# Patient Record
Sex: FEMALE | NOT HISPANIC OR LATINO | Employment: FULL TIME | URBAN - METROPOLITAN AREA
[De-identification: names, ages, dates, MRNs, and addresses within clinical notes are randomized per-mention and may not be internally consistent; named-entity substitution may affect disease eponyms.]

---

## 2017-03-28 ENCOUNTER — OFFICE VISIT (OUTPATIENT)
Dept: DERMATOLOGY | Facility: CLINIC | Age: 42
End: 2017-03-28
Payer: COMMERCIAL

## 2017-03-28 DIAGNOSIS — L71.9 ACNE ROSACEA: ICD-10-CM
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
## 2017-03-28 DIAGNOSIS — D22.9 MULTIPLE BENIGN NEVI: ICD-10-CM

## 2017-03-28 PROCEDURE — 99213 OFFICE O/P EST LOW 20 MIN: CPT | Performed by: DERMATOLOGY

## 2017-03-28 RX ORDER — AZELAIC ACID 0.15 G/G
GEL TOPICAL
Qty: 50 G | Refills: 8 | Status: SHIPPED | OUTPATIENT
Start: 2017-03-28 | End: 2018-03-27

## 2017-03-28 RX ORDER — IVERMECTIN 10 MG/G
CREAM TOPICAL
Qty: 30 G | Refills: 3 | Status: SHIPPED | OUTPATIENT
Start: 2017-03-28 | End: 2018-02-01

## 2017-03-28 RX ORDER — IVERMECTIN 10 MG/G
CREAM TOPICAL
Qty: 30 G | Refills: 3 | Status: SHIPPED | OUTPATIENT
Start: 2017-03-28 | End: 2017-03-28

## 2017-03-28 NOTE — PROGRESS NOTES
[facility] Dermatology Note      Dermatology Problem List:  1. Acne rosacea  -Current Tx: Soolantra (initiated 8/2/2016), Azelaic acid 15% gel (initiaed 8/13/2015)  -Previous Tx: minocycline (initiated, (3/25/2014-8/13/2015, restarted 8/2/2016) with nausea, Metrogel (initiated 3/25/2014)  2. Melasma, cheeks  -Previous Tx: hydroquinone (initiated 7/8/2014)    Encounter Date: Mar 28, 2017    CC:  Chief Complaint   Patient presents with     Derm Problem     rosacea         History of Present Illness:  Ms. Nichole Berkowitz is a 41 year old female who presents as a follow-up for acne rosacea. The patient was last seen 10/6/2016 when metrogel was held and Mirvaso started. Today, the patient reports that she is using Soolantra and Azelaic Acid, one in the am and one in the pm with improvement and is stable. Reports history of tanning when she was younger. Denies any lesions bleeding, crusting or changing. The patient reports no other lesions of concern.    Past Medical History:   Patient Active Problem List   Diagnosis     Acne rosacea     Melasma     History reviewed. No pertinent past medical history.  History reviewed. No pertinent surgical history.    Social History:  The patient works as a . The patient uses 3-6  alcoholic beverages per week.     Family History:  There is no family history of skin cancer.  There is no family history of melanoma.  There is no family history of asthma, eczema or allergies.    Medications:  Current Outpatient Prescriptions   Medication Sig Dispense Refill     ivermectin (SOOLANTRA) 1 % cream Apply to the affected areas of the face once daily. Use a pea-size amount for each area of the face (forehead, chin, nose, each cheek) that is affected. Spread as a thin layer, avoiding the eyes and lips. 30 g 3     Biotin 10 MG CAPS        Probiotic Product (PROBIOTIC & ACIDOPHILUS EX ST PO)        Azelaic Acid 15 % gel Apply a thin layer to the face once  daily. 50 g 8     Omega-3 Fatty Acids (OMEGA-3 FISH OIL PO)        multivitamin, therapeutic with minerals (MULTI-VITAMIN) TABS Take 1 tablet by mouth daily 100 tablet 3     Ascorbic Acid 500 MG CHEW Take 1 chew tab by mouth 30 tablet      Allergies   Allergen Reactions     Sulfa Drugs Hives     Review of Systems:   -Skin: As above in HPI. No additional skin concerns.  -Const: The patient is generally feeling well today.     Physical exam:  GEN: This is a well developed, well-nourished female in no acute distress, in a pleasant mood.    SKIN: Total skin excluding the undergarment areas was performed. The exam included the head/face, neck, both arms, chest, back, abdomen, both legs, digits and/or nails. Declines genital exam or exam of buttocks.  -Telangiectasias on the cheeks.   -Multiple regular brown pigmented macules and papules are identified on the trunk and extremities.  -Nails are painted.    -No other lesions of concern on areas examined.     Impression/Plan:  1. History of tanning    Sun precaution was advised including the use of sun screens of SPF 30 or higher, sun protective clothing, and avoidance of tanning beds.  2. Multiple clinically benign nevi, trunk and extremities    No further intervention required at this time.     Recommend checking nails for moles between polish changes. Discussed with patient what features to watch  3. Acne rosacea, face. Nausea with minocycline use. Improvement with topicals, is happy with current treatment plan.     Continue Soolantra 1% cream. Apply to affected areas on the face once daily. Use a pea-sized amount for each area of the face that is affected. Spread a thin layer, avoiding the eyes and lips.    Continue azelaic acid 15% gel. Apply once daily to the face in the morning    Discussed PDL in the fall. Discussed the patient cannot be tan for procedure.     Follow up in 1 year, earlier for new or changing lesions.     Staff Involved:  Staff/Scribe    Scribe  Disclosure:   I, Olivia Lal, am serving as a scribe to document services personally performed by Dr. Mere Liu, based on data collection and the provider's statements to me.     Provider Disclosure:   I agree with above History, Review of Systems, Physical exam and Plan. I have reviewed the content of the documentation and have edited it as needed. I have personally performed the services documented here and the documentation accurately represents those services and the decisions I have made.     Mere Liu MD    Department of Dermatology  [facility]: Phone: [phone], Fax:[phone]  [facility]: Phone: [phone], Fax: [phone]

## 2017-03-28 NOTE — MR AVS SNAPSHOT
After Visit Summary   3/28/2017    Nichole Berkowitz    MRN: [id number]           Patient Information     Date Of Birth          1975        Visit Information        Provider Department      3/28/2017 9:30 AM Mere Liu MD [facility]        Today's Diagnoses     Tanning bed exposure, initial encounter    -  1    Rosacea        Acne rosacea           Follow-ups after your visit        Your next 10 appointments already scheduled     Mar 27, 2018  9:30 AM CDT   Return Visit with Mere Liu MD   [facility] ([facility]    [id number] [place] [address] [phone]              Who to contact     If you have questions or need follow up information about today's clinic visit or your schedule please contact [facility] directly at [phone].  Normal or non-critical lab and imaging results will be communicated to you by MyChart, letter or phone within 4 business days after the clinic has received the results. If you do not hear from us within 7 days, please contact the clinic through MyChart or phone. If you have a critical or abnormal lab result, we will notify you by phone as soon as possible.  Submit refill requests through Dovetail or call your pharmacy and they will forward the refill request to us. Please allow 3 business days for your refill to be completed.          Additional Information About Your Visit        MyChart Information     Dovetail is an electronic gateway that provides easy, online access to your medical records. With Dovetail, you can request a clinic appointment, read your test results, renew a prescription or communicate with your care team.     To sign up for Dovetail visit the website at [email or web address]   You will be asked to enter the access code listed below, as well as some personal information. Please follow the directions to create your username and password.      Your access code is: [email or web address]  Expires: 2017 10:24 AM     Your access code will  in 90 days. If you need help or a new code, please contact your [facility] or call [phone] for assistance.        Care EveryWhere ID     This is your Care EveryWhere ID. This could be used by other organizations to access your Mobile medical records  [id number]         Blood Pressure from Last 3 Encounters:   14 101/78    Weight from Last 3 Encounters:   10/17/14 174 lb (78.9 kg)              Today, you had the following     No orders found for display         Where to get your medicines      These medications were sent to [place] [address] [place] [facility] [place], [place] - [address] [facility]  [address] [facility] [address]     Phone:  [phone]     Azelaic Acid 15 % gel         Some of these will need a paper prescription and others can be bought over the counter.  Ask your nurse if you have questions.     Bring a paper prescription for each of these medications     ivermectin 1 % cream          Primary Care Provider Office Phone # Fax #    Sea Hernandez [phone]       [address]  [facility] [address]        Thank you!     Thank you for choosing [facility]  for your care. Our goal is always to provide you with excellent care. Hearing back from our patients is one way we can continue to improve our services. Please take a few minutes to complete the written survey that you may receive in the mail after your visit with us. Thank you!             Your Updated Medication List - Protect others around you: Learn how to safely use, store and throw away your medicines at www.disposemymeds.org.          This list is accurate as of: 3/28/17 10:24 AM.  Always use your most recent med list.                   Brand Name Dispense Instructions for use    Ascorbic Acid 500 MG Chew     30 tablet    Take 1 chew tab by  mouth       Azelaic Acid 15 % gel     50 g    Apply a thin layer to the face once daily.       Biotin 10 MG Caps          ivermectin 1 % cream    SOOLANTRA    30 g    Apply to the affected areas of the face once daily. Use a pea-size amount for each area of the face (forehead, chin, nose, each cheek) that is affected. Spread as a thin layer, avoiding the eyes and lips.       Multi-vitamin Tabs tablet     100 tablet    Take 1 tablet by mouth daily       OMEGA-3 FISH OIL PO          PROBIOTIC & ACIDOPHILUS EX ST PO

## 2017-03-28 NOTE — NURSING NOTE
Dermatology Rooming Note    Nichole Berkowitz's goals for this visit include:   Chief Complaint   Patient presents with     Derm Problem     rosacea       Is a scribe okay for this visit:YES    Are records needed for this visit(If yes, obtain release of information): Not applicable     Vitals: There were no vitals taken for this visit.    Referring Provider:  ESTABLISHED PATIENT  No address on file

## 2018-02-01 DIAGNOSIS — L71.9 ACNE ROSACEA: Primary | ICD-10-CM

## 2018-02-01 RX ORDER — IVERMECTIN 10 MG/G
CREAM TOPICAL
Qty: 30 G | Refills: 1 | Status: SHIPPED | OUTPATIENT
Start: 2018-02-01 | End: 2018-03-27

## 2018-02-01 NOTE — TELEPHONE ENCOUNTER
I spoke with Nichole regarding Soolantra.  She was previously getting the medication through [facility], which is now closed.  Patient would like it sent to [facility] in [place].  If not covered or too expensive she will call us back.  Patient has a follow up with Dr. Liu 3/27/18.    Sun Velazquez RN

## 2018-03-27 ENCOUNTER — OFFICE VISIT (OUTPATIENT)
Dept: DERMATOLOGY | Facility: CLINIC | Age: 43
End: 2018-03-27
Payer: COMMERCIAL

## 2018-03-27 DIAGNOSIS — L71.9 ACNE ROSACEA: Primary | ICD-10-CM

## 2018-03-27 PROCEDURE — 99213 OFFICE O/P EST LOW 20 MIN: CPT | Performed by: DERMATOLOGY

## 2018-03-27 RX ORDER — AZELAIC ACID 0.15 G/G
GEL TOPICAL
Qty: 50 G | Refills: 8 | Status: SHIPPED | OUTPATIENT
Start: 2018-03-27 | End: 2022-06-14

## 2018-03-27 RX ORDER — IVERMECTIN 10 MG/G
CREAM TOPICAL
Qty: 30 G | Refills: 3 | Status: SHIPPED | OUTPATIENT
Start: 2018-03-27 | End: 2018-06-29

## 2018-03-27 NOTE — MR AVS SNAPSHOT
After Visit Summary   3/27/2018    Nichole Berkowitz    MRN: [id number]           Patient Information     Date Of Birth          1975        Visit Information        Provider Department      3/27/2018 9:30 AM Mere Liu MD [facility]        Today's Diagnoses     Acne rosacea    -  1       Follow-ups after your visit        Follow-up notes from your care team     Return in about 3 months (around 2018) for acne rosacea.      Who to contact     If you have questions or need follow up information about today's clinic visit or your schedule please contact [facility] directly at [phone].  Normal or non-critical lab and imaging results will be communicated to you by MyChart, letter or phone within 4 business days after the clinic has received the results. If you do not hear from us within 7 days, please contact the clinic through MyChart or phone. If you have a critical or abnormal lab result, we will notify you by phone as soon as possible.  Submit refill requests through StayNTouch or call your pharmacy and they will forward the refill request to us. Please allow 3 business days for your refill to be completed.          Additional Information About Your Visit        MyChart Information     StayNTouch is an electronic gateway that provides easy, online access to your medical records. With StayNTouch, you can request a clinic appointment, read your test results, renew a prescription or communicate with your care team.     To sign up for StayNTouch visit the website at [email or web address]   You will be asked to enter the access code listed below, as well as some personal information. Please follow the directions to create your username and password.     Your access code is: [email or web address]  Expires: 2018 10:19 AM     Your access code will  in 90 days. If you need help or a new code, please contact your [facility] or  call [phone] for assistance.        Care EveryWhere ID     This is your Care EveryWhere ID. This could be used by other organizations to access your [place] medical records  [id number]         Blood Pressure from Last 3 Encounters:   03/25/14 101/78    Weight from Last 3 Encounters:   10/17/14 78.9 kg (174 lb)              Today, you had the following     No orders found for display         Where to get your medicines      These medications were sent to [id number] - [place], [place] - [address] [facility] [address], [facility] [address]     Phone:  [phone]     Azelaic Acid 15 % gel         Some of these will need a paper prescription and others can be bought over the counter.  Ask your nurse if you have questions.     Bring a paper prescription for each of these medications     ivermectin 1 % cream          Primary Care Provider Office Phone # Fax #    Sea Hernandez [phone]       [address]  [place] [facility] [address]        Equal Access to Services     GELA ROJAS : Hadii aad ku hadasho Soomaali, waaxda luqadaha, qaybta kaalmada aderobertoyakiran, luis feldman. So [facility] [phone].    ATENCIÓN: Si habla español, tiene a cabral disposición servicios gratreMailos de asistencia lingüística. [facility] [phone].    We comply with applicable federal civil rights laws and [place] laws. We do not discriminate on the basis of race, color, national origin, age, disability, sex, sexual orientation, or gender identity.            Thank you!     Thank you for choosing [facility]  for your care. Our goal is always to provide you with excellent care. Hearing back from our patients is one way we can continue to improve our services. Please take a few minutes to complete the written survey that you may receive in the mail after your visit with us. Thank you!             Your Updated  Medication List - Protect others around you: Learn how to safely use, store and throw away your medicines at www.disposemymeds.org.          This list is accurate as of 3/27/18 10:19 AM.  Always use your most recent med list.                   Brand Name Dispense Instructions for use Diagnosis    Ascorbic Acid 500 MG Chew     30 tablet    Take 1 chew tab by mouth        Azelaic Acid 15 % gel     50 g    Apply a thin layer to the face once daily.    Acne rosacea       Biotin 10 MG Caps           ivermectin 1 % cream    SOOLANTRA    30 g    Apply to the affected areas of the face once daily. Use a pea-size amount for each area of the face (forehead, chin, nose, each cheek) that is affected. Spread as a thin layer, avoiding the eyes and lips.    Acne rosacea       Multi-vitamin Tabs tablet     100 tablet    Take 1 tablet by mouth daily        OMEGA-3 FISH OIL PO           PROBIOTIC & ACIDOPHILUS EX ST PO

## 2018-03-27 NOTE — NURSING NOTE
Dermatology Rooming Note    Nichole Berkowitz's goals for this visit include:   Chief Complaint   Patient presents with     Rosacea     Taking Soolantra - patient states that it has been working - needs  refill - but may be too expansive since [facility] is not closed       Is a scribe okay for this visit: YES    Are records needed for this visit(If yes, obtain release of information): NO     Vitals: There were no vitals taken for this visit.    Referring Provider:  ESTABLISHED PATIENT  No address on file    Pau Zepeda CMA

## 2018-03-27 NOTE — LETTER
3/27/2018         RE: Nichole Berkowitz  [address] [facility] [address]        Dear Colleague,    Thank you for referring your patient, Nichole Berkowitz, to the [facility]. Please see a copy of my visit note below.    [facility] Dermatology Note      Dermatology Problem List:  1. Acne rosacea  -Current Tx: Soolantra (initiated 8/2/2016), Azelaic acid 15% gel (initiaed 8/13/2015)  -Previous Tx: minocycline (initiated, (3/25/2014-8/13/2015, restarted 8/2/2016) with nausea, Metrogel (initiated 3/25/2014)  2. Melasma, cheeks  -Previous Tx: hydroquinone (initiated 7/8/2014)    Encounter Date: Mar 27, 2018    CC:  Chief Complaint   Patient presents with     Rosacea     Taking Soolantra - patient states that it has been working - needs  refill - but may be too expansive since [facility] is not closed         History of Present Illness:  Ms. Nichole Berkowitz is a 42 year old female who presents as a follow-up for acne rosacea. The patient was last seen 3/28/2017 when the patient was treated with soolantra and finacea for acne rosacea. The patient used minocycline a long time ago and this was not helpful.Her pharamcy did not do prior auth for soolantra so she is almost out. Declines po management.  The patient reports no other lesions of concern.    Past Medical History:   Patient Active Problem List   Diagnosis     Acne rosacea     Melasma     No past medical history on file.  No past surgical history on file.    Social History:  The patient works as a . The patient uses 3-6  alcoholic beverages per week.   Kept in chart for convenience.   Family History:  There is no family history of skin cancer.  There is no family history of melanoma.  There is no family history of asthma, eczema or allergies.  Kept in chart for convenience.       Medications:  Current Outpatient Prescriptions   Medication Sig Dispense Refill     ivermectin  (SOOLANTRA) 1 % cream Apply to the affected areas of the face once daily. Use a pea-size amount for each area of the face (forehead, chin, nose, each cheek) that is affected. Spread as a thin layer, avoiding the eyes and lips. 30 g 1     Azelaic Acid 15 % gel Apply a thin layer to the face once daily. 50 g 8     Biotin 10 MG CAPS        Probiotic Product (PROBIOTIC & ACIDOPHILUS EX ST PO)        Omega-3 Fatty Acids (OMEGA-3 FISH OIL PO)        multivitamin, therapeutic with minerals (MULTI-VITAMIN) TABS Take 1 tablet by mouth daily 100 tablet 3     Ascorbic Acid 500 MG CHEW Take 1 chew tab by mouth 30 tablet      Allergies   Allergen Reactions     Sulfa Drugs Hives     Review of Systems:   -Skin: As above in HPI. No additional skin concerns.  -Const: The patient is generally feeling well today. No recent illness or changes in medical problems.     Physical exam:  GEN: This is a well developed, well-nourished female in no acute distress, in a pleasant mood.    SKIN: Acne exam, which includes the face, neck, upper central chest, and upper central back was performed.  -x4 acneiform papule son the left cheek and x2 on the right cheek , x1 on the right postauricular.   -No other lesions of concern on areas examined.     Impression/Plan:  1. History of tanning     No longer tanning    2. Acne rosacea, face. Nausea with minocycline use. Improvement with topicals, is happy with current treatment plan. Does not want po medications    Continue Soolantra 1% cream. Apply to affected areas on the face once daily. Use a pea-sized amount for each area of the face that is affected. Spread a thin layer, avoiding the eyes and lips.    Continue azelaic acid 15% gel. Apply once daily to the face in the morning    Consider dapsone gel for the face or sodium sulfacetamide wash     Consider sodium sulfacetamide wash    Follow up in 3 months, earlier for new or changing lesions.     Staff Involved:  Staff/Scribe    Scribe Disclosure:   I  Laura Burch, am serving as a scribe to document services personally performed by Dr. Mere Liu, based on data collection and the provider's statements to me.     Provider Disclosure:   The documentation recorded by the scribe accurately reflects the services I personally performed and the decisions made by me.    Mere Liu MD    Department of Dermatology  [facility] [place]: Phone: [phone], Fax:[phone]  [facility] [place]: Phone: [phone], Fax: [phone]        Again, thank you for allowing me to participate in the care of your patient.        Sincerely,        Mere Liu MD

## 2018-03-27 NOTE — PROGRESS NOTES
[facility] Dermatology Note      Dermatology Problem List:  1. Acne rosacea  -Current Tx: Soolantra (initiated 8/2/2016), Azelaic acid 15% gel (initiaed 8/13/2015)  -Previous Tx: minocycline (initiated, (3/25/2014-8/13/2015, restarted 8/2/2016) with nausea, Metrogel (initiated 3/25/2014)  2. Melasma, cheeks  -Previous Tx: hydroquinone (initiated 7/8/2014)    Encounter Date: Mar 27, 2018    CC:  Chief Complaint   Patient presents with     Rosacea     Taking Soolantra - patient states that it has been working - needs  refill - but may be too expansive since [facility] is not closed         History of Present Illness:  Ms. Nichole Berkowitz is a 42 year old female who presents as a follow-up for acne rosacea. The patient was last seen 3/28/2017 when the patient was treated with soolantra and finacea for acne rosacea. The patient used minocycline a long time ago and this was not helpful.Her pharamcy did not do prior auth for soolantra so she is almost out. Declines po management.  The patient reports no other lesions of concern.    Past Medical History:   Patient Active Problem List   Diagnosis     Acne rosacea     Melasma     No past medical history on file.  No past surgical history on file.    Social History:  The patient works as a . The patient uses 3-6  alcoholic beverages per week.   Kept in chart for convenience.   Family History:  There is no family history of skin cancer.  There is no family history of melanoma.  There is no family history of asthma, eczema or allergies.  Kept in chart for convenience.       Medications:  Current Outpatient Prescriptions   Medication Sig Dispense Refill     ivermectin (SOOLANTRA) 1 % cream Apply to the affected areas of the face once daily. Use a pea-size amount for each area of the face (forehead, chin, nose, each cheek) that is affected. Spread as a thin layer, avoiding the eyes and lips. 30 g 1     Azelaic Acid 15 % gel Apply a thin  layer to the face once daily. 50 g 8     Biotin 10 MG CAPS        Probiotic Product (PROBIOTIC & ACIDOPHILUS EX ST PO)        Omega-3 Fatty Acids (OMEGA-3 FISH OIL PO)        multivitamin, therapeutic with minerals (MULTI-VITAMIN) TABS Take 1 tablet by mouth daily 100 tablet 3     Ascorbic Acid 500 MG CHEW Take 1 chew tab by mouth 30 tablet      Allergies   Allergen Reactions     Sulfa Drugs Hives     Review of Systems:   -Skin: As above in HPI. No additional skin concerns.  -Const: The patient is generally feeling well today. No recent illness or changes in medical problems.     Physical exam:  GEN: This is a well developed, well-nourished female in no acute distress, in a pleasant mood.    SKIN: Acne exam, which includes the face, neck, upper central chest, and upper central back was performed.  -x4 acneiform papule son the left cheek and x2 on the right cheek , x1 on the right postauricular.   -No other lesions of concern on areas examined.     Impression/Plan:  1. History of tanning     No longer tanning    2. Acne rosacea, face. Nausea with minocycline use. Improvement with topicals, is happy with current treatment plan. Does not want po medications    Continue Soolantra 1% cream. Apply to affected areas on the face once daily. Use a pea-sized amount for each area of the face that is affected. Spread a thin layer, avoiding the eyes and lips.    Continue azelaic acid 15% gel. Apply once daily to the face in the morning    Consider dapsone gel for the face or sodium sulfacetamide wash     Consider sodium sulfacetamide wash    Follow up in 3 months, earlier for new or changing lesions.     Staff Involved:  Staff/Scribe    Scribe Disclosure:   Laura COLLINS, am serving as a scribe to document services personally performed by Dr. Mere Liu, based on data collection and the provider's statements to me.     Provider Disclosure:   The documentation recorded by the scribe accurately reflects the services KARINA  personally performed and the decisions made by me.    Mere Liu MD    Department of Dermatology  [facility]: Phone: [phone], Fax:[phone]  [facility]: Phone: [phone], Fax: [phone]

## 2018-04-01 ENCOUNTER — HEALTH MAINTENANCE LETTER (OUTPATIENT)
Age: 43
End: 2018-04-01

## 2018-04-03 ENCOUNTER — TELEPHONE (OUTPATIENT)
Dept: DERMATOLOGY | Facility: CLINIC | Age: 43
End: 2018-04-03

## 2018-04-10 NOTE — TELEPHONE ENCOUNTER
Central Prior Authorization Team   Phone: [phone]    PA Initiation    Medication: ivermectin (SOOLANTRA) 1 % cream  Insurance Company: Amanda Huff [employer] - Phone [phone] Fax [phone]  Pharmacy Filling the Rx: [facility] [address] [place], [place] - [address] AT [facility] [address]  Filling Pharmacy Phone: [phone]  Filling Pharmacy Fax: [phone]  Start Date: 4/10/2018

## 2018-04-16 NOTE — TELEPHONE ENCOUNTER
Writer spoke with pharmacist. Soolantra cost for patient out of pocket is $657. Insurance preferred for patient to use Retin-A prior to pharmacist and per PA patient needs to try and fail doxycycline.     Routing to Dr. Liu to review and advise.    Amarilis Gamble, Ngozi Gonzalez   [facility]at[facility] [place] 31 minutes ago (11:07 AM)                 Hi, script is denied because pt must try/fail formulary alternative doxycycline tab/cap. If Dr would like to appeal, please have Dr submit a letter of medical necessity to PA team.

## 2018-04-16 NOTE — TELEPHONE ENCOUNTER
PRIOR AUTHORIZATION DENIED    Medication: ivermectin (SOOLANTRA) 1 % cream - denied    Denial Date: 4/12/2018    Denial Rational: script is denied because pt must try/fail formulary alternative doxycycline tab/cap                 Appeal Information:

## 2018-04-16 NOTE — TELEPHONE ENCOUNTER
Patient has stomach upset from minocycline which is related to docycyline. Doxycycline can be harder with more stomach upset issues. WE could try low dose doxy if they want. OTherwise, retin-A okay but make sure not preg or breast feeding and review retinoid handout    Topical Retinoids    What are topical retinoids?    These are medicines that are related to Vitamin A. They are used on the skin.    Retin-A , Renova , Differin , and Tazorac  are brand names.    Come in creams and clear gels    Used to treat skin conditions like pimples (acne), face wrinkling, or dark-colored sunspots    How do I use these medicines?    Wash face and let dry for 15 to 30 minutes.    Use a large pea-size amount of medicine to cover the whole face. Do not put on close to the eyes and lips. Rub in gently.     Start by using every other day. If you have no irritation after a few days, start to use it daily.     You might have too much irritation with daily use. Use it less often until the irritation goes away. Then try to increase slowly to daily use.     Irritation improves over time.    You may use moisturizer if your skin becomes dry. Look for  non-comedogenic  (non-pore plugging) and oil free products.     What are the side effects?    Dryness     Peeling and flaking     Irritation of the skin     Possible increased chance of sunburns. Protect your skin from sunlight. Wear a hat and use a sunscreen with SPF 30 or higher. Your sunscreen should have both UVA and UVB (broad-spectrum) protection.    Who should I call with questions?    [facility]: [phone]     [facility]: [phone]    For urgent needs outside of business hours call the [facility] at [phone] and ask for the dermatology resident on call

## 2018-04-24 NOTE — TELEPHONE ENCOUNTER
I left a message for patient to call [facility].  Three attempts to reach patient - closing encounter.  [employer] message sent.  Sun Velazquez RN

## 2018-06-29 ENCOUNTER — OFFICE VISIT (OUTPATIENT)
Dept: DERMATOLOGY | Facility: CLINIC | Age: 43
End: 2018-06-29
Payer: COMMERCIAL

## 2018-06-29 VITALS — SYSTOLIC BLOOD PRESSURE: 132 MMHG | DIASTOLIC BLOOD PRESSURE: 84 MMHG

## 2018-06-29 DIAGNOSIS — L71.9 ACNE ROSACEA: ICD-10-CM

## 2018-06-29 DIAGNOSIS — D22.9 MULTIPLE BENIGN NEVI: Primary | ICD-10-CM

## 2018-06-29 PROCEDURE — 99213 OFFICE O/P EST LOW 20 MIN: CPT | Performed by: DERMATOLOGY

## 2018-06-29 RX ORDER — METRONIDAZOLE 7.5 MG/G
GEL TOPICAL
Qty: 45 G | Refills: 11 | Status: SHIPPED | OUTPATIENT
Start: 2018-06-29 | End: 2019-05-21

## 2018-06-29 RX ORDER — METRONIDAZOLE 7.5 MG/G
GEL TOPICAL AT BEDTIME
COMMUNITY
End: 2018-06-29

## 2018-06-29 ASSESSMENT — PAIN SCALES - GENERAL: PAINLEVEL: NO PAIN (0)

## 2018-06-29 NOTE — LETTER
6/29/2018         RE: Nichole Berkowitz  [address] [address]        Dear Colleague,    Thank you for referring your patient, Nichole Berkowitz, to the [facility]. Please see a copy of my visit note below.    [facility] Dermatology Note      Dermatology Problem List:  1. Acne rosacea  -Current Tx: Soolantra (initiated 8/2/2016, stopped due to expense), Azelaic acid 15% gel (initiaed 8/13/2015 stopped due to expense)  -Previous Tx: minocycline (initiated, (3/25/2014-8/13/2015, restarted 8/2/2016) with nausea, Metrogel (initiated 3/25/2014)  2. Melasma, cheeks  -Previous Tx: hydroquinone (initiated 7/8/2014)    Encounter Date: Jun 29, 2018    CC:  Chief Complaint   Patient presents with     Derm Problem     New lesion on back along bra line     Rosacea     Insurance doesn't cover Finacea any more.  Soolantra too expensive.           History of Present Illness:  Ms. Nichole Berkowitz is a 42 year old female who presents as a follow-up for acne rosacea. The patient was last seen 3/27/18 when she was told to continue her acne regiment. Today the patient reports that she no longer uses her Soolantra. She still uses azelaic acid, but her insurance will no longer cover it, so she only has a small amount remaining which she applies at night along with Metrogel. In addition to her acne rosacea, the patient has a spot on her back found by her PCP which she would like examined at today's visit. She does not want to       Past Medical History:   Patient Active Problem List   Diagnosis     Acne rosacea     Melasma     History reviewed. No pertinent past medical history.  History reviewed. No pertinent surgical history.    Social History:  The patient works as a . The patient uses 3-6  alcoholic beverages per week.   Kept in chart for convenience.   Family History:  There is no family history of skin cancer.  There is no family  history of melanoma.  There is no family history of asthma, eczema or allergies.  Kept in chart for convenience.       Medications:  Current Outpatient Prescriptions   Medication Sig Dispense Refill     Ascorbic Acid 500 MG CHEW Take 1 chew tab by mouth 30 tablet      Azelaic Acid 15 % gel Apply a thin layer to the face once daily. 50 g 8     metroNIDAZOLE (METROGEL) 0.75 % topical gel Apply once daily to the face 45 g 11     multivitamin, therapeutic with minerals (MULTI-VITAMIN) TABS Take 1 tablet by mouth daily 100 tablet 3     Omega-3 Fatty Acids (OMEGA-3 FISH OIL PO)        Allergies   Allergen Reactions     Sulfa Drugs Hives     Review of Systems:   -Skin: As above in HPI. No additional skin concerns.  -Const: The patient is generally feeling well today. No recent illness or changes in medical problems.     Physical exam:  GEN: This is a well developed, well-nourished female in no acute distress, in a pleasant mood.    SKIN: Exam of face and back  - Multiple regular brown pigmented macules and papules are identified on the back.   - 9 acneiform papules on the cheeks and chin  -No other lesions of concern on areas examined.     Impression/Plan:    1. Acne rosacea, face. Nausea with minocycline use. Improvement with topicals, is happy with current treatment plan. Does not want po medications, does have active acnei today    Hold soolantra,- too expensive    Decrease azelaic acid to 10% OTC gel. Apply once daily to the face in the morning. 15% too expensive    Can consider Oracea or spirolactone if severe flares happen in the future.      2. Multiple benign nevi.    We will re-check the back again at next visit. The spot that was bothering might have been an irritated nevus which has since subsided.    Follow up in 1 year, earlier for new or changing lesions.     Staff Involved:  Staff/Scribe    Scribe Disclosure  I, Jean Calvillo, am serving as a scribe to document services personally performed by Dr. Mere Liu,  MD, based on data collection and the provider's statements to me.     Provider Disclosure:   The documentation recorded by the scribe accurately reflects the services I personally performed and the decisions made by me.    Mere Liu MD    Department of Dermatology  [facility]: Phone: [phone], Fax:[phone]  [facility]: Phone: [phone], Fax: [phone]        Again, thank you for allowing me to participate in the care of your patient.        Sincerely,        Mere Liu MD

## 2018-06-29 NOTE — PATIENT INSTRUCTIONS
THE ORDINARY  Azelaic Acid Suspension 10%  SIZE 1 oz/ 30 mL ITEM [id number]          online only at [employer]  $7.90

## 2018-06-29 NOTE — NURSING NOTE
Nichole Berkowitz's goals for this visit include:   Chief Complaint   Patient presents with     Derm Problem     New lesion on back along bra line     Rosacea     Insurance doesn't cover Finacea any more.  Soolantra too expensive.         She requests these members of her care team be copied on today's visit information: Sea Hernandez      PCP: Sea Hernandez    Referring Provider:  ESTABLISHED PATIENT  No address on file    Sun Velazquez RN

## 2018-06-29 NOTE — PROGRESS NOTES
[facility] Dermatology Note      Dermatology Problem List:  1. Acne rosacea  -Current Tx: Soolantra (initiated 8/2/2016, stopped due to expense), Azelaic acid 15% gel (initiaed 8/13/2015 stopped due to expense)  -Previous Tx: minocycline (initiated, (3/25/2014-8/13/2015, restarted 8/2/2016) with nausea, Metrogel (initiated 3/25/2014)  2. Melasma, cheeks  -Previous Tx: hydroquinone (initiated 7/8/2014)    Encounter Date: Jun 29, 2018    CC:  Chief Complaint   Patient presents with     Derm Problem     New lesion on back along bra line     Rosacea     Insurance doesn't cover Finacea any more.  Soolantra too expensive.           History of Present Illness:  Ms. Nichole Berkowitz is a 42 year old female who presents as a follow-up for acne rosacea. The patient was last seen 3/27/18 when she was told to continue her acne regiment. Today the patient reports that she no longer uses her Soolantra. She still uses azelaic acid, but her insurance will no longer cover it, so she only has a small amount remaining which she applies at night along with Metrogel. In addition to her acne rosacea, the patient has a spot on her back found by her PCP which she would like examined at today's visit. She does not want to       Past Medical History:   Patient Active Problem List   Diagnosis     Acne rosacea     Melasma     History reviewed. No pertinent past medical history.  History reviewed. No pertinent surgical history.    Social History:  The patient works as a . The patient uses 3-6  alcoholic beverages per week.   Kept in chart for convenience.   Family History:  There is no family history of skin cancer.  There is no family history of melanoma.  There is no family history of asthma, eczema or allergies.  Kept in chart for convenience.       Medications:  Current Outpatient Prescriptions   Medication Sig Dispense Refill     Ascorbic Acid 500 MG CHEW Take 1 chew tab by mouth 30 tablet       Azelaic Acid 15 % gel Apply a thin layer to the face once daily. 50 g 8     metroNIDAZOLE (METROGEL) 0.75 % topical gel Apply once daily to the face 45 g 11     multivitamin, therapeutic with minerals (MULTI-VITAMIN) TABS Take 1 tablet by mouth daily 100 tablet 3     Omega-3 Fatty Acids (OMEGA-3 FISH OIL PO)        Allergies   Allergen Reactions     Sulfa Drugs Hives     Review of Systems:   -Skin: As above in HPI. No additional skin concerns.  -Const: The patient is generally feeling well today. No recent illness or changes in medical problems.     Physical exam:  GEN: This is a well developed, well-nourished female in no acute distress, in a pleasant mood.    SKIN: Exam of face and back  - Multiple regular brown pigmented macules and papules are identified on the back.   - 9 acneiform papules on the cheeks and chin  -No other lesions of concern on areas examined.     Impression/Plan:    1. Acne rosacea, face. Nausea with minocycline use. Improvement with topicals, is happy with current treatment plan. Does not want po medications, does have active acnei today    Hold soolantra,- too expensive    Decrease azelaic acid to 10% OTC gel. Apply once daily to the face in the morning. 15% too expensive    Can consider Oracea or spirolactone if severe flares happen in the future.      2. Multiple benign nevi.    We will re-check the back again at next visit. The spot that was bothering might have been an irritated nevus which has since subsided.    Follow up in 1 year, earlier for new or changing lesions.     Staff Involved:  Staff/Scribe    Scribe Disclosure  I, Jean Calvillo, am serving as a scribe to document services personally performed by Dr. Mere Liu MD, based on data collection and the provider's statements to me.     Provider Disclosure:   The documentation recorded by the scribe accurately reflects the services I personally performed and the decisions made by me.    Mere Liu MD  Assistant  Professor  Department of Dermatology  [facility]: Phone: [phone], Fax:[phone]  [facility]: Phone: [phone], Fax: [phone]

## 2018-06-29 NOTE — MR AVS SNAPSHOT
After Visit Summary   6/29/2018    Nichole Berkowitz    MRN: [id number]           Patient Information     Date Of Birth          1975        Visit Information        Provider Department      6/29/2018 12:15 PM Mere Liu MD [facility]        Today's Diagnoses     Multiple benign nevi    -  1    Acne rosacea          Care Instructions    THE ORDINARY  Azelaic Acid Suspension 10%  SIZE 1 oz/ 30 mL ITEM [id number]          online only at [employer]  $7.90                Follow-ups after your visit        Your next 10 appointments already scheduled     Jun 28, 2019 12:15 PM CDT   [facility] Visit with Mere Liu MD   [facility]    [address] [place] [address] [phone]              Who to contact     If you have questions or need follow up information about today's clinic visit or your schedule please contact [facility] directly at [phone].  Normal or non-critical lab and imaging results will be communicated to you by [employer], letter or phone within 4 business days after the clinic has received the results. If you do not hear from us within 7 days, please contact the clinic through [employer] or phone. If you have a critical or abnormal lab result, we will notify you by phone as soon as possible.  Submit refill requests through [employer] or call your pharmacy and they will forward the refill request to us. Please allow 3 business days for your refill to be completed.          Additional Information About Your Visit        [employer] Information     [employer] gives you secure access to your electronic health record. If you see a primary care provider, you can also send messages to your care team and make appointments. If you have questions, please call your primary care clinic.  If you do not have a primary care provider, please call [phone] and they will assist you.      [employer] is an electronic  gateway that provides easy, online access to your medical records. With [employer], you can request a clinic appointment, read your test results, renew a prescription or communicate with your care team.     To access your existing account, please contact your [facility] or call [phone] for assistance.        Care EveryWhere ID     This is your Care EveryWhere ID. This could be used by other organizations to access your [place] medical records  [id number]         Blood Pressure from Last 3 Encounters:   06/29/18 132/84   03/25/14 101/78    Weight from Last 3 Encounters:   10/17/14 78.9 kg (174 lb)              Today, you had the following     No orders found for display         Today's Medication Changes          These changes are accurate as of 6/29/18  1:33 PM.  If you have any questions, ask your nurse or doctor.               These medicines have changed or have updated prescriptions.        Dose/Directions    metroNIDAZOLE 0.75 % topical gel   Commonly known as:  METROGEL   This may have changed:    - how to take this  - when to take this  - additional instructions   Used for:  Acne rosacea   Changed by:  Mere Liu MD        Apply once daily to the face   Quantity:  45 g   Refills:  11            Where to get your medicines      These medications were sent to [id number] - [place], [place] - [address] AT [facility] [address], [facility] [address]     Phone:  [phone]     metroNIDAZOLE 0.75 % topical gel                Primary Care Provider Office Phone # Fax #    Sea Hernandez [phone]       [address]  [place] [facility] [address]        Equal Access to Services     GELA [facility] UnitCELESTINE : Julio C Marie, storm pandya, luis joya. So [facility] [phone].    ATENCIÓN: Si habla español, tiene a cabral disposición  servicios gratuitos de asistencia lingüística. Kendall mahajan [phone].    We comply with applicable federal civil rights laws and [place] laws. We do not discriminate on the basis of race, color, national origin, age, disability, sex, sexual orientation, or gender identity.            Thank you!     Thank you for choosing [facility]  for your care. Our goal is always to provide you with excellent care. Hearing back from our patients is one way we can continue to improve our services. Please take a few minutes to complete the written survey that you may receive in the mail after your visit with us. Thank you!             Your Updated Medication List - Protect others around you: Learn how to safely use, store and throw away your medicines at www.disposemymeds.org.          This list is accurate as of 6/29/18  1:33 PM.  Always use your most recent med list.                   Brand Name Dispense Instructions for use Diagnosis    Ascorbic Acid 500 MG Chew     30 tablet    Take 1 chew tab by mouth        Azelaic Acid 15 % gel     50 g    Apply a thin layer to the face once daily.    Acne rosacea       metroNIDAZOLE 0.75 % topical gel    METROGEL    45 g    Apply once daily to the face    Acne rosacea       Multi-vitamin Tabs tablet     100 tablet    Take 1 tablet by mouth daily        OMEGA-3 FISH OIL PO

## 2018-08-27 ENCOUNTER — MYC MEDICAL ADVICE (OUTPATIENT)
Dept: DERMATOLOGY | Facility: CLINIC | Age: 43
End: 2018-08-27

## 2018-08-27 DIAGNOSIS — L71.9 ACNE ROSACEA: Primary | ICD-10-CM

## 2018-08-27 NOTE — TELEPHONE ENCOUNTER
Routing to Dr. Liu to review and advise on oral treatment.      Amarilis Gamble, SHAYAN          1. Acne rosacea, face. Nausea with minocycline use. Improvement with topicals, is happy with current treatment plan. Does not want po medications, does have active acnei today    Hold soolantra,- too expensive    Decrease azelaic acid to 10% OTC gel. Apply once daily to the face in the morning. 15% too expensive    Can consider Oracea or spirolactone if severe flares happen in the future.

## 2018-08-28 NOTE — TELEPHONE ENCOUNTER
If patient has no new medical problems we could try oracea. This is low dose doxycycline. The only barrier will be if her insurance covers it.     Would she like to try this?    Review-doxycycline once daily for 3 months.  . Recommend that patient try to avoid calcium-containing products around the time of taking the medication.  Instructed to take with a full glass of fluid and food, not lying down.  Side effects of photosensitivity, headaches, GI upset need to be reviewed. Recommend sun protection.  Use form of pregnancy protection as this can impact babies bones and teeth      If this fails we could also try spironolactone. She would needs baseline labs and BP for that. THats a longer derm mediation

## 2018-08-29 NOTE — TELEPHONE ENCOUNTER
Left message for patient to call [facility] in [place] back at [phone]    Amarilis Gamble LPN

## 2018-08-31 NOTE — TELEPHONE ENCOUNTER
Patient responded to [employer] message.  Please send oracea to [facility] in [place] [address]    Pau Zepeda, CMA

## 2018-08-31 NOTE — TELEPHONE ENCOUNTER
Left message for patient to call [phone].  Mychart message also sent to patient.    Pau Zepeda, CMA

## 2018-09-04 RX ORDER — DOXYCYCLINE 40 MG/1
40 CAPSULE ORAL DAILY
Qty: 30 CAPSULE | Refills: 2 | Status: SHIPPED | OUTPATIENT
Start: 2018-09-04 | End: 2019-05-21

## 2019-02-21 ENCOUNTER — OFFICE VISIT (OUTPATIENT)
Dept: DERMATOLOGY | Facility: CLINIC | Age: 44
End: 2019-02-21
Payer: COMMERCIAL

## 2019-02-21 DIAGNOSIS — L71.8 GRANULOMATOUS ROSACEA: Primary | ICD-10-CM

## 2019-02-21 PROCEDURE — 99213 OFFICE O/P EST LOW 20 MIN: CPT | Performed by: DERMATOLOGY

## 2019-02-21 RX ORDER — PERMETHRIN 50 MG/G
CREAM TOPICAL
Qty: 60 G | Refills: 11 | Status: SHIPPED | OUTPATIENT
Start: 2019-02-21 | End: 2020-02-27

## 2019-02-21 RX ORDER — IVERMECTIN 3 MG/1
15 TABLET ORAL ONCE
Qty: 5 TABLET | Refills: 2 | Status: SHIPPED | OUTPATIENT
Start: 2019-02-21 | End: 2019-05-21

## 2019-02-21 ASSESSMENT — PAIN SCALES - GENERAL: PAINLEVEL: NO PAIN (1)

## 2019-02-21 NOTE — NURSING NOTE
Nichole Berkowitz's goals for this visit include:   Chief Complaint   Patient presents with     Acne/Rosacea     Pt currently using metrogel BID. Pt states it does not help. Pt states medication (oracea) prescribed by Dr. Liu was never filled as it was over 600 dollars.      She requests these members of her care team be copied on today's visit information:     PCP: Sea Hernandez    Referring Provider:  No referring provider defined for this encounter.    There were no vitals taken for this visit.    Do you need any medication refills at today's visit? No    Elva Calvillo LPN

## 2019-02-21 NOTE — PATIENT INSTRUCTIONS
Try over the counter azelaic acid 15%.     Use the metronidazole cream once a day, and use the permethrin cream once a day.     Take ivermectin 15 mg dose (all the pills at once) the first of every month for 3 months.

## 2019-02-21 NOTE — LETTER
2/21/2019         RE: Nichole Berkowitz  [address] [address]        Dear Colleague,    Thank you for referring your patient, Nichole Berkowitz, to the [facility]. Please see a copy of my visit note below.    [facility] Dermatology Note      Dermatology Problem List:  1. Granulomatous Roscaea  - hx of azelaic acid 15% gel, minocycline (took only 3-4 days due to stomach upset), soolantra (cost prohibitive), oracea (cost prohibitive)  - current tx: metrocream 0.75% once daily, permethrin cream once a day, ivermectin 15 mg monthly x3 months    Encounter Date: Feb 21, 2019    CC:  Chief Complaint   Patient presents with     Acne/Rosacea     Pt currently using metrogel BID. Pt states it does not help. Pt states medication (oracea) prescribed by Dr. Liu was never filled as it was over 600 dollars.        History of Present Illness:  Ms. Nichole Berkowitz is a 43 year old female who presents as a follow-up for rosacea. The patient was last seen by Dr. Liu 6/29/2018. She is currently using metrogel BID, with no relief. She has used minocycline in the past, but she was only about to take for 3-4 days due to develoment of stomach upset. She used azelaic acid 15% which helped a lot, but it was no longer covered by insurance. She was prescribed low dose doxycycline, but did not  the medication due to cost. Previous attempt at prescribing Soolantra was also too expensive for patient to purchase. Her skin continues to worsen with numerous red bumps. She is very embarrassed by her current appearance. Her skin is painful. She is using only gentle cleanser (Cetaphil) and gentle moisturizers.    No other concerns addressed today.      Past Medical History:   Patient Active Problem List   Diagnosis     Acne rosacea     Melasma     Chronic seasonal allergic rhinitis due to pollen     Family history of hemochromatosis     Social  History:  Patient reports that  has never smoked. she has never used smokeless tobacco.   The patient works as a . The patient uses 3-6  alcoholic beverages per week.   Kept in chart for convenience.     Family History:  Family History   Problem Relation Age of Onset     Skin Cancer No family hx of        Medications:  Current Outpatient Medications   Medication Sig Dispense Refill     Ascorbic Acid 500 MG CHEW Take 1 chew tab by mouth 30 tablet      metroNIDAZOLE (METROGEL) 0.75 % topical gel Apply once daily to the face 45 g 11     multivitamin, therapeutic with minerals (MULTI-VITAMIN) TABS Take 1 tablet by mouth daily 100 tablet 3     Omega-3 Fatty Acids (OMEGA-3 FISH OIL PO)        Azelaic Acid 15 % gel Apply a thin layer to the face once daily. (Patient not taking: Reported on 2/21/2019) 50 g 8     doxycycline, Rosacea, (ORACEA) 40 MG CPDR CR capsule Take 1 capsule (40 mg) by mouth daily (Patient not taking: Reported on 2/21/2019) 30 capsule 2       Allergies   Allergen Reactions     Sulfa Drugs Hives       Review of Systems:  -Constitutional: Patient is otherwise feeling well, in usual state of health.   -Skin: As above in HPI. No additional skin concerns.    Physical exam:  Vitals: There were no vitals taken for this visit.  GEN: This is a well developed, well-nourished female in no acute distress, in a pleasant mood.    SKIN: Sun-exposed skin, which includes the head/face, neck, both arms, digits, and/or nails was examined.   -bilateral medial and zygomatic cheeks: right worse than left, but both with near confluent edematous pink to red papules. Some have clear pustular head to them. Lesions extend onto temples and chin  -no significant comedones on the face  -No other lesions of concern on areas examined.       Impression/Plan:  1. Granulomatous Rosacea, severe. Patient has failed a number of first line treatments. At this time, patient has a few options remaining. She previously had good  response to azelaic acid, and this is available over the counter for lower price point than prescription. We could do doxycycline, but it is more likely than minocycline to cause stomach upset, and likely would not be tolerated by the patient. Low dose doxycycline was too expensive for the patient. We could do oral azithromycin for the first few days of the month, but I am unsure if patient would get the benefit she is hoping for from this and likely would need to continue long term. Discussed the risk of long term use of antibiotics. The last option would be to use ivermectin orally to decrease demodex. I have had good results with this for granulomatous rosacea in particular. Patient was most interested in this treatment option.       Recommended purchasing over the counter azelaic acid 15%.      Prescribed ivermectin 15 mg (5 pills) to be taken all at once on the same day of the month for 3 months.     Prescribed permethrin 5% cream once daily.    Prescribed metrocream 0.75% cream to be used once daily.       Follow-up in 3 months, earlier for new or changing lesions.       Staff Involved:  Scribe/Staff    Scribe Disclosure  I, Irina Harris, am serving as a scribe to document services personally performed by Dr. Winnie Austin MD, based on data collection and the provider's statements to me.     Provider Disclosure:   The documentation recorded by the scribe accurately reflects the services I personally performed and the decisions made by me.    Winnie Austin MD    Department of Dermatology  [facility]: Phone: [phone], Fax:[phone]  [facility] [place]: Phone: [phone], Fax: [phone]              Again, thank you for allowing me to participate in the care of your patient.        Sincerely,        Winnie Austin MD

## 2019-02-22 PROBLEM — Z83.49 FAMILY HISTORY OF HEMOCHROMATOSIS: Status: ACTIVE | Noted: 2018-05-11

## 2019-02-22 PROBLEM — J30.1 CHRONIC SEASONAL ALLERGIC RHINITIS DUE TO POLLEN: Status: ACTIVE | Noted: 2017-11-28

## 2019-05-21 ENCOUNTER — OFFICE VISIT (OUTPATIENT)
Dept: DERMATOLOGY | Facility: CLINIC | Age: 44
End: 2019-05-21
Payer: COMMERCIAL

## 2019-05-21 DIAGNOSIS — L20.84 INTRINSIC ECZEMA: ICD-10-CM

## 2019-05-21 DIAGNOSIS — L71.8 GRANULOMATOUS ROSACEA: Primary | ICD-10-CM

## 2019-05-21 PROCEDURE — 99213 OFFICE O/P EST LOW 20 MIN: CPT | Performed by: DERMATOLOGY

## 2019-05-21 RX ORDER — TRIAMCINOLONE ACETONIDE 1 MG/G
OINTMENT TOPICAL
Qty: 30 G | Refills: 2 | Status: SHIPPED | OUTPATIENT
Start: 2019-05-21 | End: 2020-02-13

## 2019-05-21 RX ORDER — IVERMECTIN 3 MG/1
TABLET ORAL
Qty: 15 TABLET | Refills: 0 | Status: SHIPPED | OUTPATIENT
Start: 2019-05-21 | End: 2019-08-21

## 2019-05-21 ASSESSMENT — PAIN SCALES - GENERAL: PAINLEVEL: NO PAIN (0)

## 2019-05-21 NOTE — PATIENT INSTRUCTIONS
We discussed treatment with Accutane (isotretinoin). This medication is monitored by the government and would require monthly visits for a while.     Continue current regimen for your rosacea.     For your upper back, use the triamcinolone 0.1% ointment twice daily only when it is itchy. Avoid getting it on the face.

## 2019-05-21 NOTE — LETTER
"    5/21/2019         RE: Nichole Berkowitz  [address] [address]        Dear Colleague,    Thank you for referring your patient, Nichole Berkowitz, to the [facility]. Please see a copy of my visit note below.    [facility] Dermatology Note      Dermatology Problem List:  1. Granulomatous Roscaea  - hx of azelaic acid 15% gel, minocycline (took only 3-4 days due to stomach upset), soolantra (cost prohibitive), oracea (cost prohibitive)  - current tx: metrocream 0.75% cream once daily, permethrin cream once a day, ivermectin 15 mg once monthly x3 months, otc azelaic acid cream (will continue this plan another 3 months).   2. Eczema, posterior neck  - current tx: triamcinolone 0.1% ointment BID    Encounter Date: May 21, 2019    CC:  Chief Complaint   Patient presents with     Rosacea     pt states things are a lot better than they were.       History of Present Illness:  Ms. Varela \"Dayana-Alicia\" COLTON Berkowitz is a 43 year old female who presents as a follow-up for rosacea. The patient was last seen by Dr. Austin 2/21/2019. She reports it is much better than it was, but it is still present. She is using metro cream and permethrin cream as well as oral ivermectin dosed once monthly. She notes that she is best in the few days after taking another dose of oral ivermectin, then skin slowly gets worse again. She found some otc azelaic acid cream but has not started as she does not know when to apply it to make it work with other topicals.     In addition, she has an tichy spot on her left upper back. It has been going on a few months. She has put benadryl cream in the area which helps when she uses it, but it flares up every couple weeks. She itches at it, and it swells.     No other concerns addressed today.      Past Medical History:   Patient Active Problem List   Diagnosis     Acne rosacea     Melasma     Chronic seasonal allergic rhinitis " due to pollen     Family history of hemochromatosis     Social History:  Patient reports that she has never smoked. She has never used smokeless tobacco.   The patient works as a . The patient uses 3-6  alcoholic beverages per week.   Kept in chart for convenience.  with one child, no plans for more children. Has an IUD to prevent pregnancy.     Family History:  Family History   Problem Relation Age of Onset     Skin Cancer No family hx of    Reviewed and left in chart for clinician convenience.       Medications:  Current Outpatient Medications   Medication Sig Dispense Refill     Ascorbic Acid 500 MG CHEW Take 1 chew tab by mouth 30 tablet      metroNIDAZOLE (METROCREAM) 0.75 % external cream Apply thin layer to the face once daily. 45 g 11     multivitamin, therapeutic with minerals (MULTI-VITAMIN) TABS Take 1 tablet by mouth daily 100 tablet 3     Omega-3 Fatty Acids (OMEGA-3 FISH OIL PO)        permethrin (ELIMITE) 5 % external cream Apply on face once daily. 60 g 11     Azelaic Acid 15 % gel Apply a thin layer to the face once daily. (Patient not taking: Reported on 2/21/2019) 50 g 8     doxycycline, Rosacea, (ORACEA) 40 MG CPDR CR capsule Take 1 capsule (40 mg) by mouth daily (Patient not taking: Reported on 2/21/2019) 30 capsule 2     metroNIDAZOLE (METROGEL) 0.75 % topical gel Apply once daily to the face (Patient not taking: Reported on 5/21/2019) 45 g 11       Allergies   Allergen Reactions     Sulfa Drugs Hives       Review of Systems:  -Constitutional: Patient is otherwise feeling well, in usual state of health.   -Skin: As above in HPI. No additional skin concerns.    Physical exam:  Vitals: There were no vitals taken for this visit.  GEN: This is a well developed, well-nourished female in no acute distress, in a pleasant mood.   SKIN: Sun-exposed skin, which includes the head/face, neck, both arms, digits, and/or nails was examined.   - edematous red pink papules more on the  right cheek than left cheek  - nose and chin relatively clear  - superficial and smaller lesions on the forehead  - left posterior to lateral neck, faint eczematous plaque  - No other lesions of concern on areas examined.       Impression/Plan:  1. Eczema, posterior neck, mild    Advised patient to hold benadryl cream as this can cause ACD.     Prescribed triamcinolone 0.1% cream to be used in areas of itch BID. Hold medication when the area is not itchy.     2. Granulomatous Rosacea, severe. Some improvement today, but still with severe granulomatous lesions. Patient has failed a number of first line treatments. She is on appropriate topicals and using diligently. Discussed treatment with an oral antibiotic. Discussed the risk of long term antibiotic use. She does not wish to pursue this. Discussed treatment with lasers, around 3-5 treatments for maximum benefit. She does not think she can afford this at this time as it is not covered by insurance. Also discussed treatment with isotretinoin, low dose and long term. Briefly reviewed the list of side effects. Discussed need for monthly visits and labs in the beginning at least. Patient will consider this as a treatment option in the future. After discussion patient okay to continue with current treatment with another 3 months of monthly ivermectin dosing orally.       Prescribed ivermectin 15 mg (5 pills) to be taken all at once on the same day of the month for 3 months.     For topicals, instructd to use 2/3 daily and alternate use.     Continue permethrin 5% cream once daily.    Continue metrocream 0.75% cream to be used once daily.     Apply otc azelaic acid cream once daily.       Follow-up in 3 months, earlier for new or changing lesions.       Staff Involved:  Scribe/Staff    Scribe Disclosure  I, Irina Harris, am serving as a scribe to document services personally performed by Dr. Winnie Austin MD, based on data collection and the provider's statements to  me.     Provider Disclosure:   The documentation recorded by the scribe accurately reflects the services I personally performed and the decisions made by me.    Winnie Austin MD    Department of Dermatology  [facility]: Phone: [phone], Fax:[phone]  [facility]: Phone: [phone], Fax: [phone]                    Again, thank you for allowing me to participate in the care of your patient.        Sincerely,        Winnie Austin MD

## 2019-05-21 NOTE — PROGRESS NOTES
"[facility] Dermatology Note      Dermatology Problem List:  1. Granulomatous Roscaea  - hx of azelaic acid 15% gel, minocycline (took only 3-4 days due to stomach upset), soolantra (cost prohibitive), oracea (cost prohibitive)  - current tx: metrocream 0.75% cream once daily, permethrin cream once a day, ivermectin 15 mg once monthly x3 months, otc azelaic acid cream (will continue this plan another 3 months).   2. Eczema, posterior neck  - current tx: triamcinolone 0.1% ointment BID    Encounter Date: May 21, 2019    CC:  Chief Complaint   Patient presents with     Rosacea     pt states things are a lot better than they were.       History of Present Illness:  Ms. Varela \"Rachelle\" COLTON Berkowitz is a 43 year old female who presents as a follow-up for rosacea. The patient was last seen by Dr. Austin 2/21/2019. She reports it is much better than it was, but it is still present. She is using metro cream and permethrin cream as well as oral ivermectin dosed once monthly. She notes that she is best in the few days after taking another dose of oral ivermectin, then skin slowly gets worse again. She found some otc azelaic acid cream but has not started as she does not know when to apply it to make it work with other topicals.     In addition, she has an tichy spot on her left upper back. It has been going on a few months. She has put benadryl cream in the area which helps when she uses it, but it flares up every couple weeks. She itches at it, and it swells.     No other concerns addressed today.      Past Medical History:   Patient Active Problem List   Diagnosis     Acne rosacea     Melasma     Chronic seasonal allergic rhinitis due to pollen     Family history of hemochromatosis     Social History:  Patient reports that she has never smoked. She has never used smokeless tobacco.   The patient works as a . The patient uses 3-6  alcoholic beverages per week.   Kept in chart for " convenience.  with one child, no plans for more children. Has an IUD to prevent pregnancy.     Family History:  Family History   Problem Relation Age of Onset     Skin Cancer No family hx of    Reviewed and left in chart for clinician convenience.       Medications:  Current Outpatient Medications   Medication Sig Dispense Refill     Ascorbic Acid 500 MG CHEW Take 1 chew tab by mouth 30 tablet      metroNIDAZOLE (METROCREAM) 0.75 % external cream Apply thin layer to the face once daily. 45 g 11     multivitamin, therapeutic with minerals (MULTI-VITAMIN) TABS Take 1 tablet by mouth daily 100 tablet 3     Omega-3 Fatty Acids (OMEGA-3 FISH OIL PO)        permethrin (ELIMITE) 5 % external cream Apply on face once daily. 60 g 11     Azelaic Acid 15 % gel Apply a thin layer to the face once daily. (Patient not taking: Reported on 2/21/2019) 50 g 8     doxycycline, Rosacea, (ORACEA) 40 MG CPDR CR capsule Take 1 capsule (40 mg) by mouth daily (Patient not taking: Reported on 2/21/2019) 30 capsule 2     metroNIDAZOLE (METROGEL) 0.75 % topical gel Apply once daily to the face (Patient not taking: Reported on 5/21/2019) 45 g 11       Allergies   Allergen Reactions     Sulfa Drugs Hives       Review of Systems:  -Constitutional: Patient is otherwise feeling well, in usual state of health.   -Skin: As above in HPI. No additional skin concerns.    Physical exam:  Vitals: There were no vitals taken for this visit.  GEN: This is a well developed, well-nourished female in no acute distress, in a pleasant mood.   SKIN: Sun-exposed skin, which includes the head/face, neck, both arms, digits, and/or nails was examined.   - edematous red pink papules more on the right cheek than left cheek  - nose and chin relatively clear  - superficial and smaller lesions on the forehead  - left posterior to lateral neck, faint eczematous plaque  - No other lesions of concern on areas examined.       Impression/Plan:  1. Eczema, posterior neck,  mild    Advised patient to hold benadryl cream as this can cause ACD.     Prescribed triamcinolone 0.1% cream to be used in areas of itch BID. Hold medication when the area is not itchy.     2. Granulomatous Rosacea, severe. Some improvement today, but still with severe granulomatous lesions. Patient has failed a number of first line treatments. She is on appropriate topicals and using diligently. Discussed treatment with an oral antibiotic. Discussed the risk of long term antibiotic use. She does not wish to pursue this. Discussed treatment with lasers, around 3-5 treatments for maximum benefit. She does not think she can afford this at this time as it is not covered by insurance. Also discussed treatment with isotretinoin, low dose and long term. Briefly reviewed the list of side effects. Discussed need for monthly visits and labs in the beginning at least. Patient will consider this as a treatment option in the future. After discussion patient okay to continue with current treatment with another 3 months of monthly ivermectin dosing orally.       Prescribed ivermectin 15 mg (5 pills) to be taken all at once on the same day of the month for 3 months.     For topicals, instructd to use 2/3 daily and alternate use.     Continue permethrin 5% cream once daily.    Continue metrocream 0.75% cream to be used once daily.     Apply otc azelaic acid cream once daily.       Follow-up in 3 months, earlier for new or changing lesions.       Staff Involved:  Scribe/Staff    Scribe Disclosure  I, Irina Harris, am serving as a scribe to document services personally performed by Dr. Winnie Austin MD, based on data collection and the provider's statements to me.     Provider Disclosure:   The documentation recorded by the scribe accurately reflects the services I personally performed and the decisions made by me.    Winnie Austin MD    Department of Dermatology  [facility]  [facility]: Phone: [phone], Fax:[phone]  [facility]: Phone: [phone], Fax: [phone]

## 2019-05-21 NOTE — NURSING NOTE
Nichole Berkowitz's goals for this visit include:   Chief Complaint   Patient presents with     Rosacea     pt states things are a lot better than they were.     She requests these members of her care team be copied on today's visit information:     PCP: Sea Hernandez    Referring Provider:  No referring provider defined for this encounter.    There were no vitals taken for this visit.    Do you need any medication refills at today's visit? Janice Calvillo LPN

## 2019-08-21 ENCOUNTER — OFFICE VISIT (OUTPATIENT)
Dept: DERMATOLOGY | Facility: CLINIC | Age: 44
End: 2019-08-21
Payer: COMMERCIAL

## 2019-08-21 DIAGNOSIS — L71.9 ACNE ROSACEA: Primary | ICD-10-CM

## 2019-08-21 PROCEDURE — 99213 OFFICE O/P EST LOW 20 MIN: CPT | Performed by: DERMATOLOGY

## 2019-08-21 RX ORDER — DOXYCYCLINE 100 MG/1
100 CAPSULE ORAL 2 TIMES DAILY
Qty: 60 CAPSULE | Refills: 2 | Status: SHIPPED | OUTPATIENT
Start: 2019-08-21 | End: 2020-02-13

## 2019-08-21 ASSESSMENT — PAIN SCALES - GENERAL: PAINLEVEL: NO PAIN (0)

## 2019-08-21 NOTE — LETTER
"    8/21/2019         RE: Nichole Berkowitz  [address]  [place] [address]        Dear Colleague,    Thank you for referring your patient, Nichole Berkowitz, to the [facility]. Please see a copy of my visit note below.    [facility] Dermatology Note      Dermatology Problem List:  1. Granulomatous Roscaea  - hx of azelaic acid 15% gel, minocycline (took only 3-4 days due to stomach upset), soolantra (cost prohibitive), oracea (cost prohibitive), ivermectin 15 mg once monthly x3 months,  - current tx: metrocream 0.75% cream once daily, permethrin cream once a day, otc azelaic acid cream (will continue this plan another 3 months), Doxycycline 100 mg BID x3 months  2. Eczema, posterior neck  - current tx: triamcinolone 0.1% ointment BID    Encounter Date: Aug 21, 2019    CC:  Chief Complaint   Patient presents with     Rosacea     no improvement       History of Present Illness:  Ms. Varela \"Dayana-Alicia\" COLTON Berkowitz is a 44 year old female who presents as a follow-up for rosacea. The patient was last seen by Dr. Austin 5/21/2019 for treatment of this condition with metrocream 0.75% cream once daily, permethrin cream once a day, ivermectin 15 mg once monthly x3 months, otc azelaic acid cream. Today, she reports there is no improvement since her last visit. She never started using finacea. She is done with her ivermectin pills. Today she is looking for more options. When she previously took minocycline it gave her extreme stomach upset. She wants to keep treating her skin because it is irritated and hurts. No other concerns addressed today.      Past Medical History:   Patient Active Problem List   Diagnosis     Acne rosacea     Melasma     Chronic seasonal allergic rhinitis due to pollen     Family history of hemochromatosis     Social History:   The patient works as a . The patient uses 3-6  alcoholic beverages per week.   Kept in chart " for convenience.  with one child, no plans for more children. Has an IUD to prevent pregnancy.     Family History:  No family history of skin cancer.  Reviewed and left in chart for clinician convenience.       Medications:  Current Outpatient Medications   Medication Sig Dispense Refill     Ascorbic Acid 500 MG CHEW Take 1 chew tab by mouth 30 tablet      ivermectin (STROMECTOL) 3 MG TABS tablet Take 15mg (5 pills) at one time. Repeat monthly x3 months. 15 tablet 0     metroNIDAZOLE (METROCREAM) 0.75 % external cream Apply thin layer to the face once daily. 45 g 11     multivitamin, therapeutic with minerals (MULTI-VITAMIN) TABS Take 1 tablet by mouth daily 100 tablet 3     Omega-3 Fatty Acids (OMEGA-3 FISH OIL PO)        permethrin (ELIMITE) 5 % external cream Apply on face once daily. 60 g 11     triamcinolone (KENALOG) 0.1 % external ointment Apply thin layer twice daily to affected areas as needed. 30 g 2     Azelaic Acid 15 % gel Apply a thin layer to the face once daily. (Patient not taking: Reported on 8/21/2019) 50 g 8       Allergies   Allergen Reactions     Sulfa Drugs Hives       Review of Systems:  -Constitutional: Patient is otherwise feeling well, in usual state of health.   -Skin: As above in HPI. No additional skin concerns.    Physical exam:  Vitals: There were no vitals taken for this visit.  GEN: This is a well developed, well-nourished female in no acute distress, in a pleasant mood.   SKIN: Sun-exposed skin, which includes the head/face, neck, both arms, digits, and/or nails was examined.   - Go type II  - granulomatous pink to purple papules on bilateral cheeks, R>L   - superficial acne papules at glabella and chin   - nose relatively clear  - no other lesions of concern on areas examined.       Impression/Plan:  1. Granulomatous rosacea, still with severe granulomatous lesions. Patient has failed a number of first line treatments. She recently completed ivermectin and is on  appropriate topicals, but never began finacea. Discussed trial of an oral antibiotic to see if we can get her skin under better control. She is hesitant to begin as her previous minocycline use made her stomach very upset. Also discussed treatment with isotretinoin, low dose and long term. Briefly reviewed the list of side effects. Discussed need for monthly visits and labs in the beginning at least. Patient will consider this as a treatment option in the future, but is not quite ready for this time committment. After discussion patient okay to continue with current topical treatment and begin doxycycline treatment.    Prescribed doxycycline 100 BID to be taken with food and a glass of water. No dairy products. Will keep patient on for 3 months. If skin does not clear, will move onto isotretinoin. If skin clears, will try to taper off or taper down to low dose.     For topicals, instructed to use 2/3 different topicals daily and alternate use.     Continue permethrin 5% cream once daily.    Continue metrocream 0.75% cream to be used once daily.     Continue otc azelaic acid cream once daily.       Follow-up in 3 months, earlier for new or changing lesions.       Staff Involved:  Scribe/Staff    Scribe Disclosure  I, Lianet Pham, am serving as a scribe to document services personally performed by Dr. Winnie Austin MD, based on data collection and the provider's statements to me.     Provider Disclosure:   The documentation recorded by the scribe accurately reflects the services I personally performed and the decisions made by me.    Winnie Austin MD    Department of Dermatology  [facility]: Phone: [phone], Fax:[phone]  [facility]: Phone: [phone], Fax: [phone]            Again, thank you for allowing me to participate in the care of your patient.         Sincerely,        Winnie Austin MD

## 2019-08-21 NOTE — PROGRESS NOTES
"[facility] Dermatology Note      Dermatology Problem List:  1. Granulomatous Roscaea  - hx of azelaic acid 15% gel, minocycline (took only 3-4 days due to stomach upset), soolantra (cost prohibitive), oracea (cost prohibitive), ivermectin 15 mg once monthly x3 months,  - current tx: metrocream 0.75% cream once daily, permethrin cream once a day, otc azelaic acid cream (will continue this plan another 3 months), Doxycycline 100 mg BID x3 months  2. Eczema, posterior neck  - current tx: triamcinolone 0.1% ointment BID    Encounter Date: Aug 21, 2019    CC:  Chief Complaint   Patient presents with     Rosacea     no improvement       History of Present Illness:  Ms. Varela \"Rachelle\" COLTON Berkowitz is a 44 year old female who presents as a follow-up for rosacea. The patient was last seen by Dr. Austin 5/21/2019 for treatment of this condition with metrocream 0.75% cream once daily, permethrin cream once a day, ivermectin 15 mg once monthly x3 months, otc azelaic acid cream. Today, she reports there is no improvement since her last visit. She never started using finacea. She is done with her ivermectin pills. Today she is looking for more options. When she previously took minocycline it gave her extreme stomach upset. She wants to keep treating her skin because it is irritated and hurts. No other concerns addressed today.      Past Medical History:   Patient Active Problem List   Diagnosis     Acne rosacea     Melasma     Chronic seasonal allergic rhinitis due to pollen     Family history of hemochromatosis     Social History:   The patient works as a . The patient uses 3-6  alcoholic beverages per week.   Kept in chart for convenience.  with one child, no plans for more children. Has an IUD to prevent pregnancy.     Family History:  No family history of skin cancer.  Reviewed and left in chart for clinician convenience.       Medications:  Current Outpatient Medications "   Medication Sig Dispense Refill     Ascorbic Acid 500 MG CHEW Take 1 chew tab by mouth 30 tablet      ivermectin (STROMECTOL) 3 MG TABS tablet Take 15mg (5 pills) at one time. Repeat monthly x3 months. 15 tablet 0     metroNIDAZOLE (METROCREAM) 0.75 % external cream Apply thin layer to the face once daily. 45 g 11     multivitamin, therapeutic with minerals (MULTI-VITAMIN) TABS Take 1 tablet by mouth daily 100 tablet 3     Omega-3 Fatty Acids (OMEGA-3 FISH OIL PO)        permethrin (ELIMITE) 5 % external cream Apply on face once daily. 60 g 11     triamcinolone (KENALOG) 0.1 % external ointment Apply thin layer twice daily to affected areas as needed. 30 g 2     Azelaic Acid 15 % gel Apply a thin layer to the face once daily. (Patient not taking: Reported on 8/21/2019) 50 g 8       Allergies   Allergen Reactions     Sulfa Drugs Hives       Review of Systems:  -Constitutional: Patient is otherwise feeling well, in usual state of health.   -Skin: As above in HPI. No additional skin concerns.    Physical exam:  Vitals: There were no vitals taken for this visit.  GEN: This is a well developed, well-nourished female in no acute distress, in a pleasant mood.   SKIN: Sun-exposed skin, which includes the head/face, neck, both arms, digits, and/or nails was examined.   - Go type II  - granulomatous pink to purple papules on bilateral cheeks, R>L   - superficial acne papules at glabella and chin   - nose relatively clear  - no other lesions of concern on areas examined.       Impression/Plan:  1. Granulomatous rosacea, still with severe granulomatous lesions. Patient has failed a number of first line treatments. She recently completed ivermectin and is on appropriate topicals, but never began finacea. Discussed trial of an oral antibiotic to see if we can get her skin under better control. She is hesitant to begin as her previous minocycline use made her stomach very upset. Also discussed treatment with  isotretinoin, low dose and long term. Briefly reviewed the list of side effects. Discussed need for monthly visits and labs in the beginning at least. Patient will consider this as a treatment option in the future, but is not quite ready for this time committment. After discussion patient okay to continue with current topical treatment and begin doxycycline treatment.    Prescribed doxycycline 100 BID to be taken with food and a glass of water. No dairy products. Will keep patient on for 3 months. If skin does not clear, will move onto isotretinoin. If skin clears, will try to taper off or taper down to low dose.     For topicals, instructed to use 2/3 different topicals daily and alternate use.     Continue permethrin 5% cream once daily.    Continue metrocream 0.75% cream to be used once daily.     Continue otc azelaic acid cream once daily.       Follow-up in 3 months, earlier for new or changing lesions.       Staff Involved:  Scribe/Staff    Scribe Disclosure  I, Lianet Pham, am serving as a scribe to document services personally performed by Dr. Winnie Austin MD, based on data collection and the provider's statements to me.     Provider Disclosure:   The documentation recorded by the scribe accurately reflects the services I personally performed and the decisions made by me.    Winnie Austin MD    Department of Dermatology  [facility]: Phone: [phone], Fax:[phone]  [facility]: Phone: [phone], Fax: [phone]

## 2019-08-21 NOTE — NURSING NOTE
@Nichole Berkowitz's goals for this visit include:   Chief Complaint   Patient presents with     Rosacea     no improvement       She requests these members of her care team be copied on today's visit information: NO    PCP: Sea Hernandez    Referring Provider:  No referring provider defined for this encounter.    There were no vitals taken for this visit.    Do you need any medication refills at today's visit? NO    Pau Zepeda CMA

## 2019-08-21 NOTE — PATIENT INSTRUCTIONS
Doxycycline     1.Doxycycline treats and prevents infections and may be used to treat acne.   2.Do not use it if you had an allergic reaction to doxycycline or another tetracycline antibiotic, or if you are pregnant or breastfeeding.  3. If you miss a dose, take a dose as soon as you remember. If it is almost time for your next dose, wait until then and take a regular dose. Do not take extra medicine to make up for a missed dose.   4 . Some foods and medicines can affect the medication. Tell your doctor if you are using any of the following: bismuth subsalicylate, isotretinoin, acitretin, medications that contain aluminum, calcium, or iron (such an antacid or vitamin supplement)  5. This medicine may cause birth defects if being used during pregnancy.  Use two forms of birth control to keep from getting pregnant.   6. Tell your doctor if you had stomach surgery or if you have a history of yeast infections.   7. This medicine may cause the following problems (stop the medication if you experience these symptoms and call your doctor):  Permanent change in tooth color (in children younger than 8 years old)   Increased pressure inside the head   Yeast infection   Diarrhea    This medicine may make your skin more sun sensitive and increase sun burns. Wear sunscreen and do not tan.     Allergic reaction with itching, hives, facial swelling, throat swelling, chest tightness, trouble breathing     Blistering, peeling, red skin rash     Burning, pain, or irritation in your upper stomach or throat     Joint pain, fever, rash, and unusual tiredness or weakness     Severe headache, dizziness, or vision changes  8. Call your doctor if your symptoms worsen or do not improve  Who should I call with questions?    [facility]: [phone]     [facility]: [phone]    For urgent needs outside of business hours call the [facility] at [phone] and ask for the  dermatology resident on call

## 2019-09-26 ENCOUNTER — OFFICE VISIT (OUTPATIENT)
Dept: OPTOMETRY | Facility: CLINIC | Age: 44
End: 2019-09-26
Payer: COMMERCIAL

## 2019-09-26 DIAGNOSIS — H02.889 MEIBOMIAN GLAND DYSFUNCTION: ICD-10-CM

## 2019-09-26 DIAGNOSIS — Z01.00 EXAMINATION OF EYES AND VISION: Primary | ICD-10-CM

## 2019-09-26 DIAGNOSIS — H52.4 PRESBYOPIA: ICD-10-CM

## 2019-09-26 DIAGNOSIS — H52.13 MYOPIA OF BOTH EYES: ICD-10-CM

## 2019-09-26 DIAGNOSIS — L71.9 ACNE ROSACEA: ICD-10-CM

## 2019-09-26 DIAGNOSIS — H52.223 REGULAR ASTIGMATISM OF BOTH EYES: ICD-10-CM

## 2019-09-26 PROCEDURE — 92015 DETERMINE REFRACTIVE STATE: CPT | Performed by: OPTOMETRIST

## 2019-09-26 PROCEDURE — 92004 COMPRE OPH EXAM NEW PT 1/>: CPT | Performed by: OPTOMETRIST

## 2019-09-26 ASSESSMENT — VISUAL ACUITY
OS_CC: 20/20
OS_SC: 20/20
OD_SC: 20/20
OD_SC+: -2
METHOD: SNELLEN - LINEAR
OD_SC: 20/20
OS_CC+: -1
OS_SC: 20/40
OD_CC: 20/20

## 2019-09-26 ASSESSMENT — REFRACTION_MANIFEST
OS_CYLINDER: +0.50
OD_CYLINDER: +0.25
OS_AXIS: 100
OS_ADD: +1.50
OD_ADD: +1.50
OD_SPHERE: -1.50
OS_SPHERE: -1.50
OD_AXIS: 122

## 2019-09-26 ASSESSMENT — SLIT LAMP EXAM - LIDS
COMMENTS: MEIBOMIAN GLAND DYSFUNCTION, COLLARETTES
COMMENTS: MEIBOMIAN GLAND DYSFUNCTION, COLLARETTES

## 2019-09-26 ASSESSMENT — REFRACTION_WEARINGRX
SPECS_TYPE: SVL
OS_AXIS: 035
OS_CYLINDER: +0.75
OD_CYLINDER: +0.50
OS_SPHERE: -1.00
OD_AXIS: 122
OD_SPHERE: -1.50

## 2019-09-26 ASSESSMENT — CONF VISUAL FIELD
OS_NORMAL: 1
OD_NORMAL: 1
METHOD: COUNTING FINGERS

## 2019-09-26 ASSESSMENT — TONOMETRY
OS_IOP_MMHG: 18
IOP_METHOD: TONOPEN
OD_IOP_MMHG: 16

## 2019-09-26 ASSESSMENT — EXTERNAL EXAM - RIGHT EYE: OD_EXAM: ROSACEA

## 2019-09-26 ASSESSMENT — CUP TO DISC RATIO
OD_RATIO: 0.1
OS_RATIO: 0.1

## 2019-09-26 ASSESSMENT — EXTERNAL EXAM - LEFT EYE: OS_EXAM: ROSACEA

## 2019-09-26 NOTE — PROGRESS NOTES
Chief Complaint   Patient presents with     Annual Eye Exam         Last Eye Exam: 2 years ago  Dilated Previously: No, side effects of dilation explained today    What are you currently using to see?  glasses       Distance Vision Acuity: Noticed gradual change in both eyes    Near Vision Acuity: Satisfied with vision while reading  unaided    Eye Comfort: dry  Do you use eye drops? : Yes: Preservative free as needed  Occupation or Hobbies:  - lots of computer work    Criss Shook  [employer]            Medical, surgical and family histories reviewed and updated 9/26/2019.       OBJECTIVE: See Ophthalmology exam    ASSESSMENT:    ICD-10-CM    1. Examination of eyes and vision Z01.00    2. Myopia of both eyes H52.13    3. Regular astigmatism of both eyes H52.223    4. Presbyopia H52.4    5. Acne rosacea L71.9    6. Meibomian gland dysfunction H02.889       PLAN:     Patient Instructions   Eyeglass prescription given.  Optional change in eyeglass prescription.  Your options are a bifocal which would allow you to see distance and near vision or separate glasses for distance and reading.    Non preserved artificiatl tears- 1 drop both eyes 4 x day.  Cliradex eyelid scrubs 2 x day for 8 weeks.  These can be purchased online [employer] or at [employer] or [employer] O[employer] foaming eyelid cleanser.    Isidoro heat masks can be purchased at [employer] to be used daily for 10-15 minutes.    Return in 1 year for a complete eye exam or sooner if needed.    Josiah Armstrong, OD

## 2019-09-26 NOTE — LETTER
9/26/2019         RE: Nichole Berkowitz  [address]  [place] [address]        Dear Colleague,    Thank you for referring your patient, Nichole Berkowitz, to the [facility]. Please see a copy of my visit note below.    Chief Complaint   Patient presents with     Annual Eye Exam         Last Eye Exam: 2 years ago  Dilated Previously: No, side effects of dilation explained today    What are you currently using to see?  glasses       Distance Vision Acuity: Noticed gradual change in both eyes    Near Vision Acuity: Satisfied with vision while reading  unaided    Eye Comfort: dry  Do you use eye drops? : Yes: Preservative free as needed  Occupation or Hobbies:  - lots of [employer] work    Criss Bouchra  [employer]            Medical, surgical and family histories reviewed and updated 9/26/2019.       OBJECTIVE: See Ophthalmology exam    ASSESSMENT:    ICD-10-CM    1. Examination of eyes and vision Z01.00    2. Myopia of both eyes H52.13    3. Regular astigmatism of both eyes H52.223    4. Presbyopia H52.4    5. Acne rosacea L71.9    6. Meibomian gland dysfunction H02.889       PLAN:     Patient Instructions   Eyeglass prescription given.  Optional change in eyeglass prescription.  Your options are a bifocal which would allow you to see distance and near vision or separate glasses for distance and reading.    Non preserved artificiatl tears- 1 drop both eyes 4 x day.  Cliradex eyelid scrubs 2 x day for 8 weeks.  These can be purchased online [employer] or at [employer] or [employer] foaming eyelid cleanser.    Isidoro heat masks can be purchased at [employer] to be used daily for 10-15 minutes.    Return in 1 year for a complete eye exam or sooner if needed.    Josiah Armstrong, EDDIE               Again, thank you for allowing me to participate in the care of your patient.        Sincerely,        Josiah Armstrong, OD

## 2019-09-26 NOTE — PATIENT INSTRUCTIONS
Eyeglass prescription given.  Optional change in eyeglass prescription.  Your options are a bifocal which would allow you to see distance and near vision or separate glasses for distance and reading.    Non preserved artificiatl tears- 1 drop both eyes 4 x day.  Cliradex eyelid scrubs 2 x day for 8 weeks.  These can be purchased online [employer] or at [email or web address] or [employer] foaming eyelid cleanser.    Isidoro heat masks can be purchased at [employer] to be used daily for 10-15 minutes.    Return in 1 year for a complete eye exam or sooner if needed.    Josiah Armstrong, EDDIE    The affects of the dilating drops last for 4- 6 hours.  You will be more sensitive to light and vision will be blurry up close.  Mydriatic sunglasses were given if needed.    Use one drop of artificial tears both eyes 3-4 x daily.  Continue to use the drops regardless if your eyes are comfortable.  Artificial tears work best as a preventative and not as well after your eyes are starting to bother you.  It may take 4- 6 weeks of using the drops before you notice improvement.  If after that time you are still having problems schedule an appointment for an evaluation and discussion of different treatments such as Restasis or Xiidra.  Dry eyes are a chronic condition and you may have more symptoms at certain times of the year.    Excess tearing can be due to the right tears not working properly or a blockage in the tear drainage system.  You can try using artificial tears 1 drop right eye 3-4 x day.  If the excess tearing is bothersome after 3-4 weeks and that doesn't help we can send you for further testing on the puncta which allows the tears to drain.  This would entail a referral to our oculo plastic specialist at the [facility].    Brands of drops that are recommended are:    Systane Complete  Systane Ultra  Systane Balance  Refresh Advanced Optive  Blink    If you are using drops more than 4 x day or have sensitivities to  preservatives I recommend non preserved artificial tears.  These come in 1 use vials.  They can be used every 1-2 hours.    Brands of drops that are recommended are:    Systane- preservative free  Refresh-  preservative free  Blink- preservative free    Gels or ointment can be used at night.    Brands recommended are:    Systane Gel  Refresh Gel  Blink Gel  Genteal Gel    Systane night time (ointment)  Refresh Celluvisc  Refresh PM (ointment)      Visine, Clear Eyes or Murine (drops that get the red out) can irritate the eyes and cause a rebound effect where the eyes become more red and you end up using more drops.  Avoid drops containing tetrahydrozoline, naphazoline, phenylephrine, oxymetazoline.      OTC Lumify is a newer product that gives immediate redness relief with out the rebound effect.  Use as needed to take the redness out.    Artificial tears may be used with other drops (such as allergy, glaucoma, antibiotics) around the same time.  Be sure to wait 5 minutes in between drops.    Heat to the eyelids can also improve your symptoms of dry eyes.  Isidoro heat masks can be purchased at [employer] to be used daily for 10-15 minutes.  Other options are gel masks that can be put in the microwave and purchased at most pharmacies.

## 2019-11-12 ENCOUNTER — OFFICE VISIT (OUTPATIENT)
Dept: DERMATOLOGY | Facility: CLINIC | Age: 44
End: 2019-11-12
Payer: COMMERCIAL

## 2019-11-12 DIAGNOSIS — L71.9 ACNE ROSACEA: Primary | ICD-10-CM

## 2019-11-12 PROCEDURE — 99213 OFFICE O/P EST LOW 20 MIN: CPT | Performed by: DERMATOLOGY

## 2019-11-12 RX ORDER — DOXYCYCLINE 40 MG/1
40 CAPSULE ORAL DAILY
Qty: 30 CAPSULE | Refills: 3 | Status: SHIPPED | OUTPATIENT
Start: 2019-11-12 | End: 2020-02-13

## 2019-11-12 NOTE — NURSING NOTE
Nichole Berkowitz's goals for this visit include:   Chief Complaint   Patient presents with     Rosacea     permethrin, metrocream and OTC azelaic acid, doxycycline, has seen improvement       She requests these members of her care team be copied on today's visit information: no    PCP: Sea Hernandez    Referring Provider:  No referring provider defined for this encounter.    There were no vitals taken for this visit.    Do you need any medication refills at today's visit? No    Marybeth Dill LPN

## 2019-11-12 NOTE — LETTER
"    11/12/2019         RE: Nichole Berkowitz  [address]  [place] [address]        Dear Colleague,    Thank you for referring your patient, Nichole Berkowitz, to the [facility]. Please see a copy of my visit note below.    [facility] Dermatology Note    Dermatology Problem List:  1. Granulomatous Roscaea  - hx of azelaic acid 15% gel, minocycline (took only 3-4 days due to stomach upset), soolantra (cost prohibitive), oracea (cost prohibitive), ivermectin 15 mg once monthly x3 months (no improvement)  - current tx: metrocream 0.75% cream once daily, permethrin cream once a day, otc azelaic acid cream once daily, Doxycycline 100 mg daily x3 months - now tapering down to either 40mg daily or continuing at 100mg daily for 3 months depending on insurance coverage.   2. Eczema, posterior neck  - current tx: triamcinolone 0.1% ointment BID    Encounter Date: Nov 12, 2019    CC:  Chief Complaint   Patient presents with     Rosacea     permethrin, metrocream and OTC azelaic acid, doxycycline, has seen improvement     History of Present Illness:  Ms. Varela \"Dayana-Alicia\" COLTON Berkowitz is a 44 year old female who presents as a follow-up for rosacea. The patient was last seen by Dr. Austin 8/21/2019 for treatment of this condition with metrocream 0.75% cream once daily, permethrin cream once a day, otc azelaic acid cream (will continue this plan another 3 months), Doxycycline 100 mg BID x3 months. Today she reports great improvement that she noticed two days after starting the doxy. After the first week of doxy she decreased her dose to 1 pill a day. Her skin has not flared on this. She is thrilled. Her face is no longer sore/painful. Redness remains but no large pimple like welts. No other concerns addressed today.    Past Medical History:   Patient Active Problem List   Diagnosis     Acne rosacea     Melasma     Chronic seasonal allergic rhinitis due to pollen "     Family history of hemochromatosis     Social History:   The patient works as a . The patient uses 3-6  alcoholic beverages per week.  with one child, no plans for more children. Has an IUD to prevent pregnancy.   Reviewed and left in chart for clinician convenience.     Family History:  No family history of skin cancer.  Reviewed and left in chart for clinician convenience.       Medications:  Current Outpatient Medications   Medication Sig Dispense Refill     Ascorbic Acid 500 MG CHEW Take 1 chew tab by mouth 30 tablet      Azelaic Acid 15 % gel Apply a thin layer to the face once daily. 50 g 8     doxycycline monohydrate (MONODOX) 100 MG capsule Take 1 capsule (100 mg) by mouth 2 times daily 60 capsule 2     metroNIDAZOLE (METROCREAM) 0.75 % external cream Apply thin layer to the face once daily. 45 g 11     multivitamin, therapeutic with minerals (MULTI-VITAMIN) TABS Take 1 tablet by mouth daily 100 tablet 3     Omega-3 Fatty Acids (OMEGA-3 FISH OIL PO)        permethrin (ELIMITE) 5 % external cream Apply on face once daily. 60 g 11     triamcinolone (KENALOG) 0.1 % external ointment Apply thin layer twice daily to affected areas as needed. 30 g 2       Allergies   Allergen Reactions     Sulfa Drugs Hives       Review of Systems:  -Constitutional: Patient is otherwise feeling well, in usual state of health.   -Skin: As above in HPI. No additional skin concerns.    Physical exam:  Vitals: There were no vitals taken for this visit.  GEN: This is a well developed, well-nourished female in no acute distress, in a pleasant mood.   SKIN: Sun-exposed skin, which includes the head/face, neck, both arms, digits, and/or nails was examined.   - Go type II  - Resolution of any granulomatous or papular lesions on the face  - Red erythema remaining on medial cheeks, nose, and chin   - no other lesions of concern on areas examined.       Impression/Plan:  1. Granulomatous rosacea, great  improvement with addition of oral antibiotics. Patient is on three different topicals for rosacea. Discussed that goal will be to taper off oral antibiotics completely, but some people are unable to do this. Discussed risks of long term antibiotic use. We will try to send rx for doxy 40mg (submicrobial dosing), but this is often not covered by insurance. If not covered, plan will be to continue 100mg daily x3 more months, then either try to taper off completely or go to use 2-3 times weekly.     Advised patient to continue taking doxycycline daily to be taken with food and a glass of water. No dairy products.     For topicals: Continue permethrin 5% cream once daily. Continue metrocream 0.75% cream to be used once daily. Continue otc azelaic acid cream once daily.       Follow-up in 3 months for rosacea, earlier for new or changing lesions.     Staff Involved:  Scribe/Staff    Scribe Disclosure  I, Lianet Pham, am serving as a scribe to document services personally performed by Dr. Winnie Austin MD, based on data collection and the provider's statements to me.     Provider Disclosure:   The documentation recorded by the scribe accurately reflects the services I personally performed and the decisions made by me.    Winnie Austin MD    Department of Dermatology  [facility]: Phone: [phone], Fax:[phone]  [facility]: Phone: [phone], Fax: [phone]              Again, thank you for allowing me to participate in the care of your patient.        Sincerely,        Winnie Austin MD

## 2019-11-12 NOTE — PROGRESS NOTES
"[facility] Dermatology Note    Dermatology Problem List:  1. Granulomatous Roscaea  - hx of azelaic acid 15% gel, minocycline (took only 3-4 days due to stomach upset), soolantra (cost prohibitive), oracea (cost prohibitive), ivermectin 15 mg once monthly x3 months (no improvement)  - current tx: metrocream 0.75% cream once daily, permethrin cream once a day, otc azelaic acid cream once daily, Doxycycline 100 mg daily x3 months - now tapering down to either 40mg daily or continuing at 100mg daily for 3 months depending on insurance coverage.   2. Eczema, posterior neck  - current tx: triamcinolone 0.1% ointment BID    Encounter Date: Nov 12, 2019    CC:  Chief Complaint   Patient presents with     Rosacea     permethrin, metrocream and OTC azelaic acid, doxycycline, has seen improvement     History of Present Illness:  Ms. Varela \"Rachelle\" COLTON Berkowitz is a 44 year old female who presents as a follow-up for rosacea. The patient was last seen by Dr. Austin 8/21/2019 for treatment of this condition with metrocream 0.75% cream once daily, permethrin cream once a day, otc azelaic acid cream (will continue this plan another 3 months), Doxycycline 100 mg BID x3 months. Today she reports great improvement that she noticed two days after starting the doxy. After the first week of doxy she decreased her dose to 1 pill a day. Her skin has not flared on this. She is thrilled. Her face is no longer sore/painful. Redness remains but no large pimple like welts. No other concerns addressed today.    Past Medical History:   Patient Active Problem List   Diagnosis     Acne rosacea     Melasma     Chronic seasonal allergic rhinitis due to pollen     Family history of hemochromatosis     Social History:   The patient works as a . The patient uses 3-6  alcoholic beverages per week.  with one child, no plans for more children. Has an IUD to prevent pregnancy.   Reviewed and left in " chart for clinician convenience.     Family History:  No family history of skin cancer.  Reviewed and left in chart for clinician convenience.       Medications:  Current Outpatient Medications   Medication Sig Dispense Refill     Ascorbic Acid 500 MG CHEW Take 1 chew tab by mouth 30 tablet      Azelaic Acid 15 % gel Apply a thin layer to the face once daily. 50 g 8     doxycycline monohydrate (MONODOX) 100 MG capsule Take 1 capsule (100 mg) by mouth 2 times daily 60 capsule 2     metroNIDAZOLE (METROCREAM) 0.75 % external cream Apply thin layer to the face once daily. 45 g 11     multivitamin, therapeutic with minerals (MULTI-VITAMIN) TABS Take 1 tablet by mouth daily 100 tablet 3     Omega-3 Fatty Acids (OMEGA-3 FISH OIL PO)        permethrin (ELIMITE) 5 % external cream Apply on face once daily. 60 g 11     triamcinolone (KENALOG) 0.1 % external ointment Apply thin layer twice daily to affected areas as needed. 30 g 2       Allergies   Allergen Reactions     Sulfa Drugs Hives       Review of Systems:  -Constitutional: Patient is otherwise feeling well, in usual state of health.   -Skin: As above in HPI. No additional skin concerns.    Physical exam:  Vitals: There were no vitals taken for this visit.  GEN: This is a well developed, well-nourished female in no acute distress, in a pleasant mood.   SKIN: Sun-exposed skin, which includes the head/face, neck, both arms, digits, and/or nails was examined.   - Go type II  - Resolution of any granulomatous or papular lesions on the face  - Red erythema remaining on medial cheeks, nose, and chin   - no other lesions of concern on areas examined.       Impression/Plan:  1. Granulomatous rosacea, great improvement with addition of oral antibiotics. Patient is on three different topicals for rosacea. Discussed that goal will be to taper off oral antibiotics completely, but some people are unable to do this. Discussed risks of long term antibiotic use. We will try  to send rx for doxy 40mg (submicrobial dosing), but this is often not covered by insurance. If not covered, plan will be to continue 100mg daily x3 more months, then either try to taper off completely or go to use 2-3 times weekly.     Advised patient to continue taking doxycycline daily to be taken with food and a glass of water. No dairy products.     For topicals: Continue permethrin 5% cream once daily. Continue metrocream 0.75% cream to be used once daily. Continue otc azelaic acid cream once daily.       Follow-up in 3 months for rosacea, earlier for new or changing lesions.     Staff Involved:  Scribe/Staff    Scribe Disclosure  I, Lianet Pham, am serving as a scribe to document services personally performed by Dr. Winnie Austin MD, based on data collection and the provider's statements to me.     Provider Disclosure:   The documentation recorded by the scribe accurately reflects the services I personally performed and the decisions made by me.    Winnie Austin MD    Department of Dermatology  [facility]: Phone: [phone], Fax:[phone]  [facility]: Phone: [phone], Fax: [phone]

## 2020-02-13 ENCOUNTER — OFFICE VISIT (OUTPATIENT)
Dept: DERMATOLOGY | Facility: CLINIC | Age: 45
End: 2020-02-13
Payer: COMMERCIAL

## 2020-02-13 DIAGNOSIS — L71.9 ACNE ROSACEA: ICD-10-CM

## 2020-02-13 DIAGNOSIS — L71.8 GRANULOMATOUS ROSACEA: Primary | ICD-10-CM

## 2020-02-13 PROCEDURE — 99213 OFFICE O/P EST LOW 20 MIN: CPT | Performed by: DERMATOLOGY

## 2020-02-13 RX ORDER — DOXYCYCLINE 100 MG/1
CAPSULE ORAL
Qty: 45 CAPSULE | Refills: 3 | Status: SHIPPED | OUTPATIENT
Start: 2020-02-13 | End: 2021-02-04

## 2020-02-13 ASSESSMENT — PAIN SCALES - GENERAL: PAINLEVEL: NO PAIN (0)

## 2020-02-13 NOTE — PROGRESS NOTES
"[facility] Dermatology Note    Dermatology Problem List:  1. Granulomatous Rosacea: clears with doxy, recurs with stopping.   - current tx: metrocream 0.75% cream once daily, permethrin cream once a day, otc azelaic acid cream once daily, Doxycycline 100 mg every 2-3 days  - hx of azelaic acid 15% gel, minocycline (took only 3-4 days due to stomach upset), soolantra (cost prohibitive), doxy 40mg (cost prohibitive), ivermectin 15 mg once monthly x3 months (no improvement)  2. Eczema, posterior neck  - current tx: triamcinolone 0.1% ointment BID    Encounter Date: Feb 13, 2020    CC:  Chief Complaint   Patient presents with     Rosacea     History of Present Illness:  Ms. Varela \"Rachelle\" COLTON Berkowitz is a 44 year old female who presents as a follow-up for rosacea. The patient was last seen by Dr. Austin on when she was treated with doxy daily x3 months and topicals. Today she reports that her insurance did not cover the cost of doxy 40 mg (was about $600), so she has been using 100 mg doxy daily. She experienced one flare of her rosacea since her last appointment when she went out of town recently and forgot her doxycycline so she was unable to take the medication for several days. Rosacea came back after 3-4 days. She continues to use her topicals diligently. No side effects from doxy, tolerates well. No other concerns addressed today.    Past Medical History:   Patient Active Problem List   Diagnosis     Acne rosacea     Melasma     Chronic seasonal allergic rhinitis due to pollen     Family history of hemochromatosis     Social History:   The patient works as a . The patient uses 3-6  alcoholic beverages per week.  with one child, no plans for more children. Has an IUD to prevent pregnancy.   Reviewed and left in chart for clinician convenience.     Family History:  No family history of skin cancer.  Reviewed and left in chart for clinician convenience. "       Medications:  Current Outpatient Medications   Medication Sig Dispense Refill     Ascorbic Acid 500 MG CHEW Take 1 chew tab by mouth 30 tablet      Azelaic Acid 15 % gel Apply a thin layer to the face once daily. 50 g 8     doxycycline monohydrate (MONODOX) 100 MG capsule Take 100mg every 2-3 days as needed for rosacea. 45 capsule 3     metroNIDAZOLE (METROCREAM) 0.75 % external cream Apply thin layer to the face once daily. 45 g 11     multivitamin, therapeutic with minerals (MULTI-VITAMIN) TABS Take 1 tablet by mouth daily 100 tablet 3     Omega-3 Fatty Acids (OMEGA-3 FISH OIL PO)        permethrin (ELIMITE) 5 % external cream Apply on face once daily. 60 g 11       Allergies   Allergen Reactions     Sulfa Drugs Hives       Review of Systems:  -Constitutional: Patient is otherwise feeling well, in usual state of health.   -Skin: As above in HPI. No additional skin concerns.    Physical exam:  Vitals: There were no vitals taken for this visit.  GEN: This is a well developed, well-nourished female in no acute distress, in a pleasant mood.   SKIN: Sun-exposed skin, which includes the head/face, neck, both arms, digits, and/or nails was examined.   - Go type II  - Pink erythema on the cheeks and chin, no inflammatory papules on exam today  - No other lesions of concern on areas examined.     Impression/Plan:  1. Granulomatous rosacea, great improvement with oral antibiotics. Patient is on three different topicals for rosacea. Goal was to taper off oral antibiotics completely, but patient flares with discontinuation. Patient's insurance did not cover doxy 40mg (submicrobial dosing). Discussed unknown risks of long term antibiotic use. Today we advised patient to begin taking 100 mg doxy every 2-3 days to minimize use of oral antibiotics.   - Advised patient to begin taking 100mg doxycycline every 2-3 daysto be taken with food and a glass of water. No dairy products. 1 year of refills provided. If she  does well at this dose for a few months, she will taper down to once weekly.   - For topicals: Continue permethrin 5% cream once daily. Continue metrocream 0.75% cream to be used once daily. Continue otc azelaic acid cream once daily.     Follow-up in 1 year for rosacea, sooner for flares, earlier for new or changing lesions.     Staff Involved:  Scribe/Staff    Scribe Disclosure  I, Lianet Pham, am serving as a scribe to document services personally performed by Dr. Winnie Austin MD, based on data collection and the provider's statements to me.     Provider Disclosure:   The documentation recorded by the scribe accurately reflects the services I personally performed and the decisions made by me.    Winnie Austin MD    Department of Dermatology  [facility]: Phone: [phone], Fax:[phone]  [facility]: Phone: [phone], Fax: [phone]

## 2020-02-13 NOTE — LETTER
"    2/13/2020         RE: Nichole Berkowitz  [address]  [place] [address]        Dear Colleague,    Thank you for referring your patient, Nichole Berkowitz, to the [facility]. Please see a copy of my visit note below.    [facility] Dermatology Note    Dermatology Problem List:  1. Granulomatous Rosacea: clears with doxy, recurs with stopping.   - current tx: metrocream 0.75% cream once daily, permethrin cream once a day, otc azelaic acid cream once daily, Doxycycline 100 mg every 2-3 days  - hx of azelaic acid 15% gel, minocycline (took only 3-4 days due to stomach upset), soolantra (cost prohibitive), doxy 40mg (cost prohibitive), ivermectin 15 mg once monthly x3 months (no improvement)  2. Eczema, posterior neck  - current tx: triamcinolone 0.1% ointment BID    Encounter Date: Feb 13, 2020    CC:  Chief Complaint   Patient presents with     Rosacea     History of Present Illness:  Ms. Varela \"Dayana-Alicia\" COLTON Berkowitz is a 44 year old female who presents as a follow-up for rosacea. The patient was last seen by Dr. Austin on when she was treated with doxy daily x3 months and topicals. Today she reports that her insurance did not cover the cost of doxy 40 mg (was about $600), so she has been using 100 mg doxy daily. She experienced one flare of her rosacea since her last appointment when she went out of town recently and forgot her doxycycline so she was unable to take the medication for several days. Rosacea came back after 3-4 days. She continues to use her topicals diligently. No side effects from doxy, tolerates well. No other concerns addressed today.    Past Medical History:   Patient Active Problem List   Diagnosis     Acne rosacea     Melasma     Chronic seasonal allergic rhinitis due to pollen     Family history of hemochromatosis     Social History:   The patient works as a . The patient uses 3-6  alcoholic beverages per " week.  with one child, no plans for more children. Has an IUD to prevent pregnancy.   Reviewed and left in chart for clinician convenience.     Family History:  No family history of skin cancer.  Reviewed and left in chart for clinician convenience.       Medications:  Current Outpatient Medications   Medication Sig Dispense Refill     Ascorbic Acid 500 MG CHEW Take 1 chew tab by mouth 30 tablet      Azelaic Acid 15 % gel Apply a thin layer to the face once daily. 50 g 8     doxycycline monohydrate (MONODOX) 100 MG capsule Take 100mg every 2-3 days as needed for rosacea. 45 capsule 3     metroNIDAZOLE (METROCREAM) 0.75 % external cream Apply thin layer to the face once daily. 45 g 11     multivitamin, therapeutic with minerals (MULTI-VITAMIN) TABS Take 1 tablet by mouth daily 100 tablet 3     Omega-3 Fatty Acids (OMEGA-3 FISH OIL PO)        permethrin (ELIMITE) 5 % external cream Apply on face once daily. 60 g 11       Allergies   Allergen Reactions     Sulfa Drugs Hives       Review of Systems:  -Constitutional: Patient is otherwise feeling well, in usual state of health.   -Skin: As above in HPI. No additional skin concerns.    Physical exam:  Vitals: There were no vitals taken for this visit.  GEN: This is a well developed, well-nourished female in no acute distress, in a pleasant mood.   SKIN: Sun-exposed skin, which includes the head/face, neck, both arms, digits, and/or nails was examined.   - Go type II  - Pink erythema on the cheeks and chin, no inflammatory papules on exam today  - No other lesions of concern on areas examined.     Impression/Plan:  1. Granulomatous rosacea, great improvement with oral antibiotics. Patient is on three different topicals for rosacea. Goal was to taper off oral antibiotics completely, but patient flares with discontinuation. Patient's insurance did not cover doxy 40mg (submicrobial dosing). Discussed unknown risks of long term antibiotic use. Today we advised  patient to begin taking 100 mg doxy every 2-3 days to minimize use of oral antibiotics.   - Advised patient to begin taking 100mg doxycycline every 2-3 daysto be taken with food and a glass of water. No dairy products. 1 year of refills provided. If she does well at this dose for a few months, she will taper down to once weekly.   - For topicals: Continue permethrin 5% cream once daily. Continue metrocream 0.75% cream to be used once daily. Continue otc azelaic acid cream once daily.     Follow-up in 1 year for rosacea, sooner for flares, earlier for new or changing lesions.     Staff Involved:  Scribe/Staff    Scribe Disclosure  I, Lianet Pham, am serving as a scribe to document services personally performed by Dr. Winnie Austin MD, based on data collection and the provider's statements to me.     Provider Disclosure:   The documentation recorded by the scribe accurately reflects the services I personally performed and the decisions made by me.    Winnie Austin MD    Department of Dermatology  [facility]: Phone: [phone], Fax:[phone]  [facility]: Phone: [phone], Fax: [phone]              Again, thank you for allowing me to participate in the care of your patient.        Sincerely,        Winnie Austin MD

## 2020-02-13 NOTE — NURSING NOTE
Nichole Berkowitz's goals for this visit include:   Chief Complaint   Patient presents with     Rosacea     using doxycycline monohydrate (MONODOX) 100 MG capsule, metroNIDAZOLE (METROCREAM) 0.75 % external cream, permethrin (ELIMITE) 5 % external cream     She requests these members of her care team be copied on today's visit information:     PCP: Sea Hernandez    Referring Provider:  No referring provider defined for this encounter.    There were no vitals taken for this visit.    Do you need any medication refills at today's visit? No

## 2020-02-25 DIAGNOSIS — L71.8 GRANULOMATOUS ROSACEA: ICD-10-CM

## 2020-02-27 RX ORDER — PERMETHRIN 50 MG/G
CREAM TOPICAL
Qty: 60 G | Refills: 11 | Status: SHIPPED | OUTPATIENT
Start: 2020-02-27 | End: 2021-02-04

## 2020-02-27 NOTE — TELEPHONE ENCOUNTER
permethrin (ELIMITE) 5 % external cream      Last Written Prescription Date:  2-21-19  Last Fill Quantity: 60 g,   # refills: 11  Last Office Visit : 2-13-20  Future Office visit:  2-4-21    Last clinic note: - For topicals: Continue permethrin 5% cream once daily.     Routing refill request to provider for review/approval because:  Med not on protocol

## 2020-03-02 ENCOUNTER — HEALTH MAINTENANCE LETTER (OUTPATIENT)
Age: 45
End: 2020-03-02

## 2021-01-25 ENCOUNTER — OFFICE VISIT (OUTPATIENT)
Dept: OPTOMETRY | Facility: CLINIC | Age: 46
End: 2021-01-25
Payer: COMMERCIAL

## 2021-01-25 DIAGNOSIS — H52.13 MYOPIA OF BOTH EYES: ICD-10-CM

## 2021-01-25 DIAGNOSIS — H52.4 PRESBYOPIA: ICD-10-CM

## 2021-01-25 DIAGNOSIS — H02.889 MEIBOMIAN GLAND DYSFUNCTION: ICD-10-CM

## 2021-01-25 DIAGNOSIS — H52.223 REGULAR ASTIGMATISM OF BOTH EYES: ICD-10-CM

## 2021-01-25 DIAGNOSIS — Z01.00 EXAMINATION OF EYES AND VISION: Primary | ICD-10-CM

## 2021-01-25 PROCEDURE — 92014 COMPRE OPH EXAM EST PT 1/>: CPT | Performed by: OPTOMETRIST

## 2021-01-25 PROCEDURE — 92015 DETERMINE REFRACTIVE STATE: CPT | Performed by: OPTOMETRIST

## 2021-01-25 ASSESSMENT — TONOMETRY
OS_IOP_MMHG: 21
OD_IOP_MMHG: 17
IOP_METHOD: TONOPEN

## 2021-01-25 ASSESSMENT — CONF VISUAL FIELD
OS_NORMAL: 1
OD_NORMAL: 1

## 2021-01-25 ASSESSMENT — SLIT LAMP EXAM - LIDS
COMMENTS: MEIBOMIAN GLAND DYSFUNCTION
COMMENTS: MEIBOMIAN GLAND DYSFUNCTION

## 2021-01-25 ASSESSMENT — REFRACTION_WEARINGRX
OD_AXIS: 122
OD_CYLINDER: +0.50
SPECS_TYPE: SVL
OS_ADD: +1.50
OD_SPHERE: -1.50
OS_SPHERE: -1.00
OS_AXIS: 100
OS_SPHERE: -1.50
OD_AXIS: 122
OS_CYLINDER: +0.50
OD_ADD: +1.50
OD_SPHERE: -1.50
OD_CYLINDER: +0.25
OS_CYLINDER: +0.75
OS_AXIS: 035

## 2021-01-25 ASSESSMENT — VISUAL ACUITY
OD_SC: 20/30
CORRECTION_TYPE: GLASSES
OS_SC: 20/40
METHOD: SNELLEN - LINEAR
OS_SC: 20/30-2
OD_SC+: -1
OS_CC: 20/20
OD_SC: 20/30-2
OD_CC: 20/20

## 2021-01-25 ASSESSMENT — REFRACTION_MANIFEST
OD_ADD: +1.50
OS_ADD: +1.50
OS_AXIS: 035
OD_SPHERE: -1.25
OS_SPHERE: -1.25
OD_CYLINDER: SPHERE
OS_CYLINDER: +0.50

## 2021-01-25 ASSESSMENT — CUP TO DISC RATIO
OS_RATIO: 0.1
OD_RATIO: 0.1

## 2021-01-25 ASSESSMENT — EXTERNAL EXAM - RIGHT EYE: OD_EXAM: ROSACEA

## 2021-01-25 ASSESSMENT — EXTERNAL EXAM - LEFT EYE: OS_EXAM: ROSACEA

## 2021-01-25 NOTE — LETTER
1/25/2021         RE: Nichole Berkowitz  [address]  [place] [address]        Dear Colleague,    Thank you for referring your patient, Nichole Berkowitz, to the [facility]. Please see a copy of my visit note below.    Chief Complaint   Patient presents with     Annual Eye Exam      Accompanied by self  Last Eye Exam: 9-  Dilated Previously: Yes    What are you currently using to see?  glasses       Distance Vision Acuity: Noticed gradual change in both eyes    Near Vision Acuity: Satisfied with vision while reading  unaided    Eye Comfort: dry  Do you use eye drops? 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          Medical, surgical and family histories reviewed and updated 1/25/2021.       OBJECTIVE: See Ophthalmology exam    ASSESSMENT:    ICD-10-CM    1. Examination of eyes and vision  Z01.00 EYE EXAM (SIMPLE-NONBILLABLE)   2. Myopia of both eyes  H52.13 REFRACTION   3. Regular astigmatism of both eyes  H52.223 REFRACTION   4. Presbyopia  H52.4 REFRACTION   5. Meibomian gland dysfunction  H02.889 EYE EXAM (SIMPLE-NONBILLABLE)      PLAN:     Patient Instructions   Eyeglass prescription given.  Glasses for distance vision only.    Continue eyelid cleansing at night with tea tree oil foam.    Heat to the eyes daily for 10-15 minutes nightly with warm washcloth or reusable gel masks from the pharmacy or  InSample heat masks can be purchased at [employer].    Artificial tears- 1 drop both eyes 2-4 x daily.\    Return in 1 year for a complete eye exam or sooner if needed.    Josiah rAmstrong, EDDIE           Again, thank you for allowing me to participate in the care of your patient.        Sincerely,        Josiah Armstrong, OD

## 2021-01-25 NOTE — PROGRESS NOTES
Chief Complaint   Patient presents with     Annual Eye Exam      Accompanied by self  Last Eye Exam: 9-  Dilated Previously: Yes    What are you currently using to see?  glasses       Distance Vision Acuity: Noticed gradual change in both eyes    Near Vision Acuity: Satisfied with vision while reading  unaided    Eye Comfort: dry  Do you use eye drops? 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          Medical, surgical and family histories reviewed and updated 1/25/2021.       OBJECTIVE: See Ophthalmology exam    ASSESSMENT:    ICD-10-CM    1. Examination of eyes and vision  Z01.00 EYE EXAM (SIMPLE-NONBILLABLE)   2. Myopia of both eyes  H52.13 REFRACTION   3. Regular astigmatism of both eyes  H52.223 REFRACTION   4. Presbyopia  H52.4 REFRACTION   5. Meibomian gland dysfunction  H02.889 EYE EXAM (SIMPLE-NONBILLABLE)      PLAN:     Patient Instructions   Eyeglass prescription given.  Glasses for distance vision only.    Continue eyelid cleansing at night with tea tree oil foam.    Heat to the eyes daily for 10-15 minutes nightly with warm washcloth or reusable gel masks from the pharmacy or  FiberSensing heat masks can be purchased at [employer].    Artificial tears- 1 drop both eyes 2-4 x daily.\    Return in 1 year for a complete eye exam or sooner if needed.    Josiah Armstrong, OD

## 2021-01-25 NOTE — PATIENT INSTRUCTIONS
Eyeglass prescription given.  Glasses for distance vision only.    Continue eyelid cleansing at night with tea tree oil foam.    Heat to the eyes daily for 10-15 minutes nightly with warm washcloth or reusable gel masks from the pharmacy or  [employer] heat masks can be purchased at [employer].    Artificial tears- 1 drop both eyes 2-4 x daily.\    Return in 1 year for a complete eye exam or sooner if needed.    Josiah Armstrong, OD    The affects of the dilating drops last for 4- 6 hours.  You will be more sensitive to light and vision will be blurry up close.  Do not drive if you do not feel comfortable.  Mydriatic sunglasses were given if needed.      Optometry Providers       Clinic Locations                                 Telephone Number   Dr. Cora Degroot [phone]     [facility] Hours:                [facility] Hours:       [facility] Hours:   [address] [facility] [address]   [address] [facility]     [address]  [place] [address]   [place] [address]    [place] [address]  Phone: [phone]                    Phone: [phone]     Phone: [phone]                      Monday 8:00-7:00                          Monday 8:00-7:00                          Monday 8:00-7:00              Tuesday 8:00-6:00                          Tuesday 8:00-7:00                          Tuesday 8:00-7:00              Wednesday 8:00-6:00                  Wednesday 8:00-7:00                   Wednesday 8:00-7:00      Thursday 8:00-6:00                        Thursday 8:00-7:00                         Thursday 8:00-7:00            Friday 8:00-5:00                              Friday 8:00-5:00                              Friday 8:00-5:00    [facility] Hours:   [address] [facility] [place] [id number] [phone]    Monday 8:00-7:00  Tuesday 8:00-7:00  Wednesday 8:00-7:00  Thursday 8:00-7:00  Friday  8:00-5:00  Please log on to [email or web address] to order your contact lenses.  The link is found on the [facility] page.  As always, Thank you for trusting us with your health care needs!

## 2021-02-04 ENCOUNTER — OFFICE VISIT (OUTPATIENT)
Dept: DERMATOLOGY | Facility: CLINIC | Age: 46
End: 2021-02-04
Payer: COMMERCIAL

## 2021-02-04 DIAGNOSIS — L21.9 DERMATITIS, SEBORRHEIC: ICD-10-CM

## 2021-02-04 DIAGNOSIS — L71.8 GRANULOMATOUS ROSACEA: Primary | ICD-10-CM

## 2021-02-04 DIAGNOSIS — L71.9 ACNE ROSACEA: ICD-10-CM

## 2021-02-04 DIAGNOSIS — D23.9 DILATED PORE OF WINER: ICD-10-CM

## 2021-02-04 PROCEDURE — 99214 OFFICE O/P EST MOD 30 MIN: CPT | Performed by: DERMATOLOGY

## 2021-02-04 RX ORDER — DOXYCYCLINE 100 MG/1
CAPSULE ORAL
Qty: 45 CAPSULE | Refills: 3 | Status: SHIPPED | OUTPATIENT
Start: 2021-02-04 | End: 2022-04-05

## 2021-02-04 RX ORDER — TRIAMCINOLONE ACETONIDE 1 MG/G
OINTMENT TOPICAL
Qty: 30 G | Refills: 11 | Status: SHIPPED | OUTPATIENT
Start: 2021-02-04 | End: 2022-06-15

## 2021-02-04 RX ORDER — PERMETHRIN 50 MG/G
CREAM TOPICAL
Qty: 60 G | Refills: 11 | Status: SHIPPED | OUTPATIENT
Start: 2021-02-04 | End: 2022-04-05

## 2021-02-04 NOTE — PROGRESS NOTES
[facility] Dermatology Note  Encounter Date: Feb 4, 2021  Office Visit     Dermatology Problem List:  1. Granulomatous Rosacea: clears with doxy, recurs with stopping.   - current tx: metrocream 0.75% cream once daily, permethrin cream once a day, otc azelaic acid cream once daily, Doxycycline 100 mg every 2-3 days  - hx of azelaic acid 15% gel, minocycline (took only 3-4 days due to stomach upset), soolantra (cost prohibitive), doxy 40mg (cost prohibitive), ivermectin 15 mg once monthly x3 months (no improvement), doxy once weekly (flares)  2. Eczema and seb derm, posterior neck  - current tx: triamcinolone 0.1% ointment BID    Social history:  with one child, no plans for more children. Has an IUD to prevent pregnancy,  with vasectomy. Works in [employer].    ____________________________________________    Assessment & Plan:     # Granulomatous rosacea, chronic condition, currently flared. Patient is on three different topicals for rosacea. Goal was to taper off oral antibiotics completely, but patient flares with discontinuation. Patient's insurance did not cover doxy 40mg (submicrobial dosing). Tried to taper doxy down to 100mg once weekly but now flared. Was clear on 2-3 times weekly. Discussed unknown risks of long term antibiotic use, patient noted understanding. Today we advised patient to begin taking 100 mg doxy every 2-3 days as needed to try to minimize use of oral antibiotics. Discussed oral isotretinoin as an alternative, but Nichole does not think she will be able to do this with current schedule.  - Advised patient to begin taking 100mg doxycycline every 2-3 days to be taken with food and a glass of water. No dairy products. 1 year of refills provided.   - For topicals: Continue permethrin 5% cream once daily. Continue metrocream 0.75% cream to be used once daily. Continue otc azelaic acid cream once daily.   - Declines Accutane    # Seborrheic dermatitis.   - Start  TAC 0.1% PRN to the affected areas    # Dilated pore of caleb: Reassured of benign nature    Procedures Performed:   None.    Follow-up: 1 year(s) in-person, or earlier for new or changing lesions    Staff and Scribe:     Scribe Disclosure:   I, Chris Garrett, am serving as a scribe to document services personally performed by this physician, Dr. Winnie Austin, based on data collection and the provider's statements to me.     Provider Disclosure:   The documentation recorded by the scribe accurately reflects the services I personally performed and the decisions made by me.    Winnie Austin MD    Department of Dermatology  [facility]: Phone: [phone], Fax:[phone]  [facility]: Phone: [phone], Fax: [phone]    ____________________________________________    CC: RECHECK (yearly recheck acne rosacea- taking doxy weekly with recurrance)    HPI:  Ms. Nichole Berkowitz is a(n) 45 year old female who presents today as a return patient for rosacea.    The patient was last seen 2/13/20 for rosacea. At that time, plan was to continue topicals and try to taper doxycycline down from every 2-3 days to once weekly.    Today, the patient reports she has been taking her doxycycline weekly with some recurrence of rosacea. She has been taking doxycyline once weekly, which she believes she's been doing for the past 3 months. She believes she was clear when she was taking doxycycline 2-3 times per week. She has no plans for children in the near future. Denies side effects of doxycycline. No sunburns. Very careful with sun protection.    She also notes she had an itchy spot on her neck and has been started on TAC in the past for this. She notes her ears recently became itchy, and is curious if this is a similar issue.    Notes a new skin lesion on her right upper back.    Otherwise  denies any tender, nonhealing, or bleeding skin lesions.    Patient is otherwise feeling well, without additional concerns.     Labs:  NA    Physical Exam:  Vitals: There were no vitals taken for this visit.  SKIN: Focused examination of face was performed.  - At least 12 granulomatous papules on medial cheeks  - Erythema on the chin and nose  - Pink plaques with overlying yellow scale on post auricular skin and occipital hair line  - Dilated pore of caleb on the right posterior shoulder  - No other lesions of concern on areas examined.     Medications:  Current Outpatient Medications   Medication     Azelaic Acid 15 % gel     doxycycline monohydrate (MONODOX) 100 MG capsule     metroNIDAZOLE (METROCREAM) 0.75 % external cream     permethrin (ELIMITE) 5 % external cream     No current facility-administered medications for this visit.       Past Medical History:   Patient Active Problem List   Diagnosis     Acne rosacea     Melasma     Chronic seasonal allergic rhinitis due to pollen     Family history of hemochromatosis     Past Medical History:   Diagnosis Date     Hypertension        CC No referring provider defined for this encounter. on close of this encounter.

## 2021-02-04 NOTE — LETTER
2/4/2021         RE: Nichole Berkowitz  [address] [facility] [address]        Dear Colleague,    Thank you for referring your patient, Nichole Berkowitz, to the [facility]. Please see a copy of my visit note below.    [facility] Dermatology Note  Encounter Date: Feb 4, 2021  Office Visit     Dermatology Problem List:  1. Granulomatous Rosacea: clears with doxy, recurs with stopping.   - current tx: metrocream 0.75% cream once daily, permethrin cream once a day, otc azelaic acid cream once daily, Doxycycline 100 mg every 2-3 days  - hx of azelaic acid 15% gel, minocycline (took only 3-4 days due to stomach upset), soolantra (cost prohibitive), doxy 40mg (cost prohibitive), ivermectin 15 mg once monthly x3 months (no improvement), doxy once weekly (flares)  2. Eczema and seb derm, posterior neck  - current tx: triamcinolone 0.1% ointment BID    Social history:  with one child, no plans for more children. Has an IUD to prevent pregnancy,  with vasectomy. Works in [employer].    ____________________________________________    Assessment & Plan:     # Granulomatous rosacea, chronic condition, currently flared. Patient is on three different topicals for rosacea. Goal was to taper off oral antibiotics completely, but patient flares with discontinuation. Patient's insurance did not cover doxy 40mg (submicrobial dosing). Tried to taper doxy down to 100mg once weekly but now flared. Was clear on 2-3 times weekly. Discussed unknown risks of long term antibiotic use, patient noted understanding. Today we advised patient to begin taking 100 mg doxy every 2-3 days as needed to try to minimize use of oral antibiotics. Discussed oral isotretinoin as an alternative, but Nichole does not think she will be able to do this with current schedule.  - Advised patient to begin taking 100mg doxycycline every 2-3 days to be taken with food and a glass of  water. No dairy products. 1 year of refills provided.   - For topicals: Continue permethrin 5% cream once daily. Continue metrocream 0.75% cream to be used once daily. Continue otc azelaic acid cream once daily.   - Declines Accutane    # Seborrheic dermatitis.   - Start TAC 0.1% PRN to the affected areas    # Dilated pore of caleb: Reassured of benign nature    Procedures Performed:   None.    Follow-up: 1 year(s) in-person, or earlier for new or changing lesions    Staff and Scribe:     Scribe Disclosure:   I, Chris Garrett, am serving as a scribe to document services personally performed by this physician, Dr. Winnie Austin, based on data collection and the provider's statements to me.     Provider Disclosure:   The documentation recorded by the scribe accurately reflects the services I personally performed and the decisions made by me.    Winnie Asutin MD    Department of Dermatology  [facility]: Phone: [phone], Fax:[phone]  [facility]: Phone: [phone], Fax: [phone]    ____________________________________________    CC: RECHECK (yearly recheck acne rosacea- taking doxy weekly with recurrance)    HPI:  Ms. Nichole Berkowitz is a(n) 45 year old female who presents today as a return patient for rosacea.    The patient was last seen 2/13/20 for rosacea. At that time, plan was to continue topicals and try to taper doxycycline down from every 2-3 days to once weekly.    Today, the patient reports she has been taking her doxycycline weekly with some recurrence of rosacea. She has been taking doxycyline once weekly, which she believes she's been doing for the past 3 months. She believes she was clear when she was taking doxycycline 2-3 times per week. She has no plans for children in the near future. Denies side effects of doxycycline. No sunburns. Very careful  with sun protection.    She also notes she had an itchy spot on her neck and has been started on TAC in the past for this. She notes her ears recently became itchy, and is curious if this is a similar issue.    Notes a new skin lesion on her right upper back.    Otherwise denies any tender, nonhealing, or bleeding skin lesions.    Patient is otherwise feeling well, without additional concerns.     Labs:  NA    Physical Exam:  Vitals: There were no vitals taken for this visit.  SKIN: Focused examination of face was performed.  - At least 12 granulomatous papules on medial cheeks  - Erythema on the chin and nose  - Pink plaques with overlying yellow scale on post auricular skin and occipital hair line  - Dilated pore of caleb on the right posterior shoulder  - No other lesions of concern on areas examined.     Medications:  Current Outpatient Medications   Medication     Azelaic Acid 15 % gel     doxycycline monohydrate (MONODOX) 100 MG capsule     metroNIDAZOLE (METROCREAM) 0.75 % external cream     permethrin (ELIMITE) 5 % external cream     No current facility-administered medications for this visit.       Past Medical History:   Patient Active Problem List   Diagnosis     Acne rosacea     Melasma     Chronic seasonal allergic rhinitis due to pollen     Family history of hemochromatosis     Past Medical History:   Diagnosis Date     Hypertension        CC No referring provider defined for this encounter. on close of this encounter.        Again, thank you for allowing me to participate in the care of your patient.        Sincerely,        Wninie Austin MD

## 2021-02-28 ENCOUNTER — HEALTH MAINTENANCE LETTER (OUTPATIENT)
Age: 46
End: 2021-02-28

## 2021-03-07 NOTE — PROGRESS NOTES
[facility] Dermatology Note      Dermatology Problem List:  1. Granulomatous Roscaea  - hx of azelaic acid 15% gel, minocycline (took only 3-4 days due to stomach upset), soolantra (cost prohibitive), oracea (cost prohibitive)  - current tx: metrocream 0.75% once daily, permethrin cream once a day, ivermectin 15 mg monthly x3 months    Encounter Date: Feb 21, 2019    CC:  Chief Complaint   Patient presents with     Acne/Rosacea     Pt currently using metrogel BID. Pt states it does not help. Pt states medication (oracea) prescribed by Dr. Liu was never filled as it was over 600 dollars.        History of Present Illness:  Ms. Nichole Berkowitz is a 43 year old female who presents as a follow-up for rosacea. The patient was last seen by Dr. Liu 6/29/2018. She is currently using metrogel BID, with no relief. She has used minocycline in the past, but she was only about to take for 3-4 days due to develoment of stomach upset. She used azelaic acid 15% which helped a lot, but it was no longer covered by insurance. She was prescribed low dose doxycycline, but did not  the medication due to cost. Previous attempt at prescribing Soolantra was also too expensive for patient to purchase. Her skin continues to worsen with numerous red bumps. She is very embarrassed by her current appearance. Her skin is painful. She is using only gentle cleanser (Cetaphil) and gentle moisturizers.    No other concerns addressed today.      Past Medical History:   Patient Active Problem List   Diagnosis     Acne rosacea     Melasma     Chronic seasonal allergic rhinitis due to pollen     Family history of hemochromatosis     Social History:  Patient reports that  has never smoked. she has never used smokeless tobacco.   The patient works as a . The patient uses 3-6  alcoholic beverages per week.   Kept in chart for convenience.     Family History:  Family History   Problem Relation Age of  Onset     Skin Cancer No family hx of        Medications:  Current Outpatient Medications   Medication Sig Dispense Refill     Ascorbic Acid 500 MG CHEW Take 1 chew tab by mouth 30 tablet      metroNIDAZOLE (METROGEL) 0.75 % topical gel Apply once daily to the face 45 g 11     multivitamin, therapeutic with minerals (MULTI-VITAMIN) TABS Take 1 tablet by mouth daily 100 tablet 3     Omega-3 Fatty Acids (OMEGA-3 FISH OIL PO)        Azelaic Acid 15 % gel Apply a thin layer to the face once daily. (Patient not taking: Reported on 2/21/2019) 50 g 8     doxycycline, Rosacea, (ORACEA) 40 MG CPDR CR capsule Take 1 capsule (40 mg) by mouth daily (Patient not taking: Reported on 2/21/2019) 30 capsule 2       Allergies   Allergen Reactions     Sulfa Drugs Hives       Review of Systems:  -Constitutional: Patient is otherwise feeling well, in usual state of health.   -Skin: As above in HPI. No additional skin concerns.    Physical exam:  Vitals: There were no vitals taken for this visit.  GEN: This is a well developed, well-nourished female in no acute distress, in a pleasant mood.    SKIN: Sun-exposed skin, which includes the head/face, neck, both arms, digits, and/or nails was examined.   -bilateral medial and zygomatic cheeks: right worse than left, but both with near confluent edematous pink to red papules. Some have clear pustular head to them. Lesions extend onto temples and chin  -no significant comedones on the face  -No other lesions of concern on areas examined.       Impression/Plan:  1. Granulomatous Rosacea, severe. Patient has failed a number of first line treatments. At this time, patient has a few options remaining. She previously had good response to azelaic acid, and this is available over the counter for lower price point than prescription. We could do doxycycline, but it is more likely than minocycline to cause stomach upset, and likely would not be tolerated by the patient. Low dose doxycycline was too  expensive for the patient. We could do oral azithromycin for the first few days of the month, but I am unsure if patient would get the benefit she is hoping for from this and likely would need to continue long term. Discussed the risk of long term use of antibiotics. The last option would be to use ivermectin orally to decrease demodex. I have had good results with this for granulomatous rosacea in particular. Patient was most interested in this treatment option.       Recommended purchasing over the counter azelaic acid 15%.      Prescribed ivermectin 15 mg (5 pills) to be taken all at once on the same day of the month for 3 months.     Prescribed permethrin 5% cream once daily.    Prescribed metrocream 0.75% cream to be used once daily.       Follow-up in 3 months, earlier for new or changing lesions.       Staff Involved:  Scribe/Staff    Scribe Disclosure  I, Irina Harris, am serving as a scribe to document services personally performed by Dr. Winnie Austin MD, based on data collection and the provider's statements to me.     Provider Disclosure:   The documentation recorded by the scribe accurately reflects the services I personally performed and the decisions made by me.    Winnie Austin MD    Department of Dermatology  [facility]: Phone: [phone], Fax:[phone]  [facility]: Phone: [phone], Fax: [phone]             79.4

## 2021-04-24 ENCOUNTER — HEALTH MAINTENANCE LETTER (OUTPATIENT)
Age: 46
End: 2021-04-24

## 2021-10-03 ENCOUNTER — HEALTH MAINTENANCE LETTER (OUTPATIENT)
Age: 46
End: 2021-10-03

## 2022-03-20 ENCOUNTER — HEALTH MAINTENANCE LETTER (OUTPATIENT)
Age: 47
End: 2022-03-20

## 2022-05-15 ENCOUNTER — HEALTH MAINTENANCE LETTER (OUTPATIENT)
Age: 47
End: 2022-05-15

## 2022-06-08 NOTE — PROGRESS NOTES
[facility] Dermatology Note  Encounter Date: Jun 14, 2022  Office Visit     Dermatology Problem List:  1.   Rosacea, possibly granulomatous or acne rosacea clears with doxy, recurs with stopping.   - current tx: metrocream 0.75% cream once daily, permethrin cream once a day, otc azelaic acid cream once daily, Doxycycline 100 mg every 2-3 days  - hx of azelaic acid 15% gel, minocycline (took only 3-4 days due to stomach upset), soolantra (cost prohibitive), doxy 40mg (cost prohibitive), ivermectin 15 mg once monthly x3 months (no improvement), doxy once weekly (flares)  2. Eczema and seb derm, posterior neck  - current tx: triamcinolone 0.1% ointment BID     Social history:  with one child, no plans for more children. Has an IUD to prevent pregnancy,  with vasectomy. Works in [employer].     ____________________________________________     Assessment & Plan:      #   Rosacea, appears to be severe acne rosacea, consider granulomatous - Review etiology. Patient has failed doxycyline, metro cream, and permethrin. Discussed with patient on PDL and Accutane. Patient declined PDL as insurance does not cover PDL. For accutane, she will xconsider . Reviewed of the risks and side effects of Accutane including but not limited to mucocutaneous dryness, arthralgias, myalgias, depression, suicidal ideation, headache, blurred vision,  increase in liver function tests, increase in lipids, and teratogenic effects in females. The patient was counseled they cannot give blood while on Accutane. No personal or family history of inflammatory bowel disease or hypertriglyceridemia known to patient. Reviewed need to avoid alcohol on medication.   - Denies depression. Denies  - Patient notes she does donate blood every 6 months.She would need to stop the donation portion  - Will referred to GI as patient has hemochromatosis and family history of hematoma.   - Continue 100mg doxycycline every 2-3 days.sun  sensitivity  - Continue permethrin 5% cream once daily.   - Continue metrocream 0.75% cream to be used once daily.  - Continue otc azelaic acid cream once daily.   - Future consideration : oral isotretinoin as an alternative oral treatment     #family hx of hemachromatosis  -wants GI opinion on any issues with accutane, referral placed    Procedures Performed:   None     Follow-up: consult for GI doctor.     Staff and Scribe:     Scribe Disclosure:   I, Jericho Espinosa, am serving as a scribe to document services personally performed by this physician, Dr. Mere Liu, based on data collection and the provider's statements to me.     Provider Disclosure:   The documentation recorded by the scribe accurately reflects the services I personally performed and the decisions made by me.    Mere Liu MD    Department of Dermatology  [facility]: Phone: [phone], Fax:[phone]  [facility]: Phone: [phone], Fax: [phone]      ____________________________________________    CC: Rosacea (Flaring currently on metrocream, permetherin cream, azelaic acid pm and doxycycline q2-3 days but takes daily with bad flares)    HPI:  Ms. Nichole Berkowitz is a(n) 46 year old female who presents today as a return patient for a acne rosacea    Last seen on 2/4/21 with Dr. Austin for psoriasis. At that time, patient to continue doxycycline and permethrin 5% cream. For seb dermatitis, patient to continue triamcinolone.     Today, patient notes rash is flaring. Currently using metrocream, permetherin cream, azelaic acid pm and doxycycline. Patient notes no improvement with rash compare to previous visit.     The patient denies headache, GI upset or sun burn.     Patient is otherwise feeling well, without additional skin concerns.    Labs Reviewed:  N/A     Physical Exam:  Vitals: There were no  vitals taken for this visit.  SKIN: Focused examination of face was performed.  - acneiforms papules on the cheeks and chin, some erythema on the T zone  - No other lesions of concern on areas examined.     Medications:  Current Outpatient Medications   Medication     Azelaic Acid 15 % gel     doxycycline monohydrate (MONODOX) 100 MG capsule     metroNIDAZOLE (METROCREAM) 0.75 % external cream     permethrin (ELIMITE) 5 % external cream     triamcinolone (KENALOG) 0.1 % external ointment     No current facility-administered medications for this visit.      Past Medical History:   Patient Active Problem List   Diagnosis     Acne rosacea     Melasma     Chronic seasonal allergic rhinitis due to pollen     Family history of hemochromatosis     Past Medical History:   Diagnosis Date     Hypertension         CC Referred Self, MD  No address on file on close of this encounter.

## 2022-06-14 ENCOUNTER — OFFICE VISIT (OUTPATIENT)
Dept: DERMATOLOGY | Facility: CLINIC | Age: 47
End: 2022-06-14
Payer: COMMERCIAL

## 2022-06-14 DIAGNOSIS — L71.8 GRANULOMATOUS ROSACEA: ICD-10-CM

## 2022-06-14 DIAGNOSIS — Z83.49 FAMILY HISTORY OF HEMOCHROMATOSIS: Primary | ICD-10-CM

## 2022-06-14 DIAGNOSIS — L71.9 ACNE ROSACEA: ICD-10-CM

## 2022-06-14 PROCEDURE — 99214 OFFICE O/P EST MOD 30 MIN: CPT | Performed by: DERMATOLOGY

## 2022-06-14 RX ORDER — DOXYCYCLINE 100 MG/1
CAPSULE ORAL
Qty: 45 CAPSULE | Refills: 0 | Status: SHIPPED | OUTPATIENT
Start: 2022-06-14 | End: 2023-01-03

## 2022-06-14 RX ORDER — IVERMECTIN 10 MG/G
CREAM TOPICAL
Qty: 45 G | Refills: 4 | Status: SHIPPED | OUTPATIENT
Start: 2022-06-14 | End: 2022-06-15

## 2022-06-14 RX ORDER — AZELAIC ACID 0.15 G/G
GEL TOPICAL
Qty: 50 G | Refills: 8 | Status: SHIPPED | OUTPATIENT
Start: 2022-06-14 | End: 2023-08-17

## 2022-06-14 RX ORDER — PERMETHRIN 50 MG/G
CREAM TOPICAL
Qty: 60 G | Refills: 0 | Status: SHIPPED | OUTPATIENT
Start: 2022-06-14 | End: 2023-02-16

## 2022-06-14 ASSESSMENT — PAIN SCALES - GENERAL: PAINLEVEL: NO PAIN (0)

## 2022-06-14 NOTE — NURSING NOTE
Nichole Berkowitz's goals for this visit include:   Chief Complaint   Patient presents with     Rosacea     Flaring currently on metrocream, permetherin cream, azelaic acid pm and doxycycline q2-3 days but takes daily with bad flares       She requests these members of her care team be copied on today's visit information:     PCP: Sea Hernandez    Referring Provider:  Referred Self, MD  No address on file    There were no vitals taken for this visit.    Do you need any medication refills at today's visit? Janice Gamble LPN

## 2022-06-14 NOTE — LETTER
6/14/2022         RE: Nichole Berkowitz  [address] [facility] [address]        Dear Colleague,    Thank you for referring your patient, Nichole Berkowitz, to the [facility]. Please see a copy of my visit note below.    [facility] Dermatology Note  Encounter Date: Jun 14, 2022  Office Visit     Dermatology Problem List:  1.   Rosacea, possibly granulomatous or acne rosacea clears with doxy, recurs with stopping.   - current tx: metrocream 0.75% cream once daily, permethrin cream once a day, otc azelaic acid cream once daily, Doxycycline 100 mg every 2-3 days  - hx of azelaic acid 15% gel, minocycline (took only 3-4 days due to stomach upset), soolantra (cost prohibitive), doxy 40mg (cost prohibitive), ivermectin 15 mg once monthly x3 months (no improvement), doxy once weekly (flares)  2. Eczema and seb derm, posterior neck  - current tx: triamcinolone 0.1% ointment BID     Social history:  with one child, no plans for more children. Has an IUD to prevent pregnancy,  with vasectomy. Works in [employer].     ____________________________________________     Assessment & Plan:      #   Rosacea, appears to be severe acne rosacea, consider granulomatous - Review etiology. Patient has failed doxycyline, metro cream, and permethrin. Discussed with patient on PDL and Accutane. Patient declined PDL as insurance does not cover PDL. For accutane, she will xconsider . Reviewed of the risks and side effects of Accutane including but not limited to mucocutaneous dryness, arthralgias, myalgias, depression, suicidal ideation, headache, blurred vision,  increase in liver function tests, increase in lipids, and teratogenic effects in females. The patient was counseled they cannot give blood while on Accutane. No personal or family history of inflammatory bowel disease or hypertriglyceridemia known to patient. Reviewed need to avoid alcohol on  medication.   - Denies depression. Denies  - Patient notes she does donate blood every 6 months.She would need to stop the donation portion  - Will referred to GI as patient has hemochromatosis and family history of hematoma.   - Continue 100mg doxycycline every 2-3 days.sun sensitivity  - Continue permethrin 5% cream once daily.   - Continue metrocream 0.75% cream to be used once daily.  - Continue otc azelaic acid cream once daily.   - Future consideration : oral isotretinoin as an alternative oral treatment     #family hx of hemachromatosis  -wants GI opinion on any issues with accutane, referral placed    Procedures Performed:   None     Follow-up: consult for GI doctor.     Staff and Scribe:     Scribe Disclosure:   IJericho, am serving as a scribe to document services personally performed by this physician, Dr. Mere Liu, based on data collection and the provider's statements to me.     Provider Disclosure:   The documentation recorded by the scribe accurately reflects the services I personally performed and the decisions made by me.    Mere Liu MD    Department of Dermatology  [facility]: Phone: [phone], Fax:[phone]  [facility]: Phone: [phone], Fax: [phone]      ____________________________________________    CC: Rosacea (Flaring currently on metrocream, permetherin cream, azelaic acid pm and doxycycline q2-3 days but takes daily with bad flares)    HPI:  Ms. Nichole Berkowitz is a(n) 46 year old female who presents today as a return patient for a acne rosacea    Last seen on 2/4/21 with Dr. Austin for psoriasis. At that time, patient to continue doxycycline and permethrin 5% cream. For seb dermatitis, patient to continue triamcinolone.     Today, patient notes rash is flaring. Currently using metrocream, permetherin cream, azelaic acid pm and  doxycycline. Patient notes no improvement with rash compare to previous visit.     The patient denies headache, GI upset or sun burn.     Patient is otherwise feeling well, without additional skin concerns.    Labs Reviewed:  N/A     Physical Exam:  Vitals: There were no vitals taken for this visit.  SKIN: Focused examination of face was performed.  - acneiforms papules on the cheeks and chin, some erythema on the T zone  - No other lesions of concern on areas examined.     Medications:  Current Outpatient Medications   Medication     Azelaic Acid 15 % gel     doxycycline monohydrate (MONODOX) 100 MG capsule     metroNIDAZOLE (METROCREAM) 0.75 % external cream     permethrin (ELIMITE) 5 % external cream     triamcinolone (KENALOG) 0.1 % external ointment     No current facility-administered medications for this visit.      Past Medical History:   Patient Active Problem List   Diagnosis     Acne rosacea     Melasma     Chronic seasonal allergic rhinitis due to pollen     Family history of hemochromatosis     Past Medical History:   Diagnosis Date     Hypertension         CC Referred Self, MD  No address on file on close of this encounter.      Again, thank you for allowing me to participate in the care of your patient.        Sincerely,        Mere Liu MD

## 2022-06-14 NOTE — PATIENT INSTRUCTIONS
[facility] Dermatology Visit    Thank you for allowing us to participate in your care. Your findings, instructions and follow-up plan are as follows:      Sun screens with Iron Oxide      Skinceuticals sunscreen, fusion, carried at the [facility] or can be mailed to you by [facility] by calling [facility]:Call [phone] and ask to have product shipped to you.     Supergoop 100% [place] CC cream 50    V[employer] Opal Tinted Face [place] sunscreen 60    ISDIN Eryfotona Ageless Ultralight Tinted [place]    Can bleach clothing and towels. Please, hold this if irritated.                  When should I call my doctor?  If you are worsening or not improving, please, contact us or seek urgent care as noted below.     Who should I call with questions (adults)?  [facility] (adult and pediatric): [phone]  [facility] (adult): [phone]  For urgent needs outside of business hours call the [facility] at [phone] and ask for the dermatology resident on call  If this is a medical emergency and you are unable to reach an ER, Call [phone]    Who should I call with questions (pediatric)?  [facility]- Pediatric Dermatology  Dr. Carmela Mcconnell, Dr. Maliha Sotelo, Dr. Kate Dwyer, Brandy Lawrence, DARLENE Guzman, Dr. Caitlin Lechuga & Dr. Reginaldo Hardin  Non Urgent  Nurse Triage Line; [phone]- Dolly and Erlinda PAIGE Care Coordinators   [place] (/Complex ) [phone]    If you need a prescription refill, please contact your pharmacy. Refills are approved or denied by our physicians during normal business hours, Monday through Fridays  Per office policy, refills will not be granted if you have not been seen within the past year (or sooner depending on your child's condition).    Scheduling Information:  Pediatric Appointment  Scheduling and Call Center [phone]  Radiology Scheduling- [phone]  Sedation Unit Scheduling- [phone]  [place] Scheduling- General [phone]; Pediatric Dermatology [phone]  Main  Services: [phone]  Divehi: [phone]  Cymraes: [phone]  Hmong/Colin/Nicaraguan: [phone]  Preadmission Nursing Department Fax Number: [phone] (fax all pre-operative paperwork to this number)    For urgent matters arising during evenings, weekends, or holidays that cannot wait for normal business hours please call [phone] and ask for the dermatology resident on call to be paged.

## 2022-06-15 ENCOUNTER — OFFICE VISIT (OUTPATIENT)
Dept: OPTOMETRY | Facility: CLINIC | Age: 47
End: 2022-06-15
Payer: COMMERCIAL

## 2022-06-15 DIAGNOSIS — H02.889 MEIBOMIAN GLAND DYSFUNCTION: ICD-10-CM

## 2022-06-15 DIAGNOSIS — H52.4 PRESBYOPIA: ICD-10-CM

## 2022-06-15 DIAGNOSIS — H10.13 ALLERGIC CONJUNCTIVITIS OF BOTH EYES: ICD-10-CM

## 2022-06-15 DIAGNOSIS — H52.13 MYOPIA OF BOTH EYES: ICD-10-CM

## 2022-06-15 DIAGNOSIS — Z01.00 EXAMINATION OF EYES AND VISION: Primary | ICD-10-CM

## 2022-06-15 DIAGNOSIS — H52.223 REGULAR ASTIGMATISM OF BOTH EYES: ICD-10-CM

## 2022-06-15 PROCEDURE — 92014 COMPRE OPH EXAM EST PT 1/>: CPT | Performed by: OPTOMETRIST

## 2022-06-15 PROCEDURE — 92015 DETERMINE REFRACTIVE STATE: CPT | Performed by: OPTOMETRIST

## 2022-06-15 ASSESSMENT — EXTERNAL EXAM - LEFT EYE: OS_EXAM: ROSACEA

## 2022-06-15 ASSESSMENT — KERATOMETRY
OD_AXISANGLE2_DEGREES: 180
OS_AXISANGLE_DEGREES: 112
OS_AXISANGLE2_DEGREES: 022
OS_K2POWER_DIOPTERS: 39.50
OS_K1POWER_DIOPTERS: 39.25
OD_K2POWER_DIOPTERS: 39.50
OD_AXISANGLE_DEGREES: 090
OD_K1POWER_DIOPTERS: 39.50

## 2022-06-15 ASSESSMENT — REFRACTION_MANIFEST
OS_AXIS: 035
OD_CYLINDER: SPHERE
OD_SPHERE: -1.25
OS_CYLINDER: +0.50
OS_SPHERE: -1.25
OD_ADD: +1.75
OS_ADD: +1.75

## 2022-06-15 ASSESSMENT — SLIT LAMP EXAM - LIDS
COMMENTS: MEIBOMIAN GLAND DYSFUNCTION
COMMENTS: MEIBOMIAN GLAND DYSFUNCTION

## 2022-06-15 ASSESSMENT — VISUAL ACUITY
OD_CC: 20/20
OS_CC: 20/20
OS_CC+: -1
OD_SC: 20/25
OS_SC: 20/25
OS_SC+: -1
METHOD: SNELLEN - LINEAR
OS_SC: 20/25
CORRECTION_TYPE: GLASSES
OD_SC: 20/30
OD_SC+: -1

## 2022-06-15 ASSESSMENT — TONOMETRY
IOP_METHOD: TONOPEN
OS_IOP_MMHG: 15
OD_IOP_MMHG: 15

## 2022-06-15 ASSESSMENT — REFRACTION_WEARINGRX
OS_AXIS: 035
SPECS_TYPE: SVL
OS_SPHERE: -1.25
OD_CYLINDER: SPHERE
OD_SPHERE: -1.25
OS_CYLINDER: +0.50

## 2022-06-15 ASSESSMENT — CUP TO DISC RATIO
OS_RATIO: 0.1
OD_RATIO: 0.1

## 2022-06-15 ASSESSMENT — EXTERNAL EXAM - RIGHT EYE: OD_EXAM: ROSACEA

## 2022-06-15 ASSESSMENT — CONF VISUAL FIELD
OD_NORMAL: 1
OS_NORMAL: 1

## 2022-06-15 NOTE — PROGRESS NOTES
Chief Complaint   Patient presents with     Annual Eye Exam      Accompanied by son  Last Eye Exam: 1-  Dilated Previously: Yes    What are you currently using to see?  glasses       Distance Vision Acuity: Satisfied with vision    Near Vision Acuity: Satisfied with vision while reading  unaided    Eye Comfort: dry-uses foam at night and heat plus drops daily.  Seems to be working well. Sometimes allergies affect the eyes- not using an allergy drop.  Do you use eye drops? : Yes: refresh  Occupation or Hobbies: office computer all day     Isa Xavier Optometric Assistant, A.B.O.C.          Medical, surgical and family histories reviewed and updated 6/15/2022.       OBJECTIVE: See Ophthalmology exam    ASSESSMENT:    ICD-10-CM    1. Examination of eyes and vision  Z01.00 EYE EXAM (SIMPLE-NONBILLABLE)   2. Myopia of both eyes  H52.13 REFRACTION   3. Regular astigmatism of both eyes  H52.223 REFRACTION   4. Presbyopia  H52.4 REFRACTION   5. Meibomian gland dysfunction  H02.889 EYE EXAM (SIMPLE-NONBILLABLE)   6. Allergic conjunctivitis of both eyes  H10.13        PLAN:     Patient Instructions   Eyeglass prescription given.  Glasses for distance vision only.  No change in eyeglass prescription.    OTC readers +1.00 as needed for small print.    Continue eyelid cleansing at night with tea tree oil foam.     Heat to the eyes daily for 10-15 minutes nightly with warm washcloth or reusable gel masks from the pharmacy or  [employer] heat masks can be purchased at Amazon.     Artificial tears- 1 drop both eyes 2-4 x daily.    If no improvement of symptoms then prescription for Restasis or Doxycycline.    OTC Pataday to be used once or twice daily for itchy eyes.  Use as needed.    Return in 1 year for a complete eye exam and dilated fundus exam or sooner if needed.    Josiah Armstrong, OD

## 2022-06-15 NOTE — LETTER
6/15/2022         RE: Nichole Berkowitz  [address]  [place] [facility] [address]        Dear Colleague,    Thank you for referring your patient, Ncihole Berkowitz, to the [facility]. Please see a copy of my visit note below.    Chief Complaint   Patient presents with     Annual Eye Exam      Accompanied by son  Last Eye Exam: 1-  Dilated Previously: Yes    What are you currently using to see?  glasses       Distance Vision Acuity: Satisfied with vision    Near Vision Acuity: Satisfied with vision while reading  unaided    Eye Comfort: dry-uses foam at night and heat plus drops daily.  Seems to be working well.  Do you use eye drops? : Yes: refresh  Occupation or Hobbies: office computer all day     Isa Xavier Optometric Assistant, A.B.O.C.          Medical, surgical and family histories reviewed and updated 6/15/2022.       OBJECTIVE: See Ophthalmology exam    ASSESSMENT:    ICD-10-CM    1. Examination of eyes and vision  Z01.00 EYE EXAM (SIMPLE-NONBILLABLE)   2. Myopia of both eyes  H52.13 REFRACTION   3. Regular astigmatism of both eyes  H52.223 REFRACTION   4. Presbyopia  H52.4 REFRACTION   5. Meibomian gland dysfunction  H02.889 EYE EXAM (SIMPLE-NONBILLABLE)       PLAN:     Patient Instructions   Eyeglass prescription given.  Glasses for distance vision only.  No change in eyeglass prescription.    OTC readers +1.00 as needed for small print.    Continue eyelid cleansing at night with tea tree oil foam.     Heat to the eyes daily for 10-15 minutes nightly with warm washcloth or reusable gel masks from the pharmacy or  [employer] heat masks can be purchased at Amazon.     Artificial tears- 1 drop both eyes 2-4 x daily.    If no improvement of symptoms then prescription for Restasis or Doxycycline.    Return in 1 year for a complete eye exam and dilated fundus exam or sooner if needed.    Josiah Armstrong, OD           Again, thank you for allowing me to participate in  the care of your patient.        Sincerely,        Josiah Armstrong, OD

## 2022-06-15 NOTE — PATIENT INSTRUCTIONS
Eyeglass prescription given.  Glasses for distance vision only.  No change in eyeglass prescription.    OTC readers +1.00 as needed for small print.    Be sure to take frequent breaks(every 20 minutes for 20 seconds look as far away as possible) when on the computer or your device for long periods of time and physically get away from the screen at least once an hour.      Continue eyelid cleansing at night with tea tree oil foam.     Heat to the eyes daily for 10-15 minutes nightly with warm washcloth or reusable gel masks from the pharmacy or  [employer] heat masks can be purchased at Amazon.     Artificial tears- 1 drop both eyes 2-4 x daily.    If no improvement of symptoms then prescription for Restasis or Doxycycline.    OTC Pataday to be used once or twice daily for itchy eyes.  Use as needed.    Return in 1 year for a complete eye exam and dilated fundus exam or sooner if needed.    Josiah Armstrong, EDDIE    There is a combination of three treatments which can greatly improve symptoms of dry eyes.     Artificial tears  Heat (eyes closed)  Eyelid and eyelash cleansing (eyes closed)     Use one drop of artificial tears both eyes 4 x daily.  Once in the morning, lunch, dinner and bedtime. Continue to use the drops regardless if your eyes are comfortable or not.  Artificial tears work best as a preventative and not as well after your eyes are starting to bother you.  It may take 4- 6 weeks of using the drops before you notice improvement.  If after that time you are still having problems schedule an appointment for an evaluation and discussion of different treatments such as Restasis or Xiidra.  Dry eyes are a chronic condition and you may have more symptoms at certain times of the year.    Excess tearing can be due to the right tears not working properly or a blockage in the tear drainage system.  You can try using artificial tears 1 drop both eyes 4 x day.  If the excess tearing is bothersome after 4-6 weeks of treatment  then we can send you for further testing.  This would entail a referral to our oculoplastic specialist Dr. Janet Bush at the [facility]-[phone].    Recommended brands are:    Systane Complete  Systane Ultra  Systane Balance  Refresh Advanced Optive  Refresh Relieva  Blink    Recommended brands for contact lens wearers are:    Systane contacts  Refresh contacts  Blink contacts    If you are using drops more than 4 x day or have sensitivities to preservatives I recommend non preserved artificial tears.  These come in 1 use vials.  They can be used every 1-2 hours.  Do not reuse the vials.    Recommended brands are:    Refresh Optive Franki-3  Systane- preservative free  Refresh-  preservative free  Blink- preservative free    Gels or ointment can be used at night.    Recommended brands are:    Systane Gel  Refresh Gel  Blink Gel  Genteal Gel    Systane night time (ointment)  Refresh Celluvisc  Refresh PM (ointment)      Visine, Clear Eyes or Murine (drops that get the red out) can irritate the eyes and cause a rebound effect where the eyes become more red and you end up using more drops.  Avoid drops containing tetrahydrozoline, naphazoline, phenylephrine, oxymetazoline.      OTC Lumify is a newer product that gives immediate redness relief without the rebound effect.  Use as needed to take the redness out.    Artificial tears may be used with other drops (such as allergy, glaucoma, antibiotics) around the same time.  Be sure to wait 5 minutes in between drops.    Heat to the eyelids can also improve your symptoms of dry eyes.  Isidoro heat masks can be purchased at Amazon to be used nightly for 10-15 minutes.  Other options are gel masks that can be put in the microwave and purchased at most pharmacies.      Tea Tree Oil eyelid cleansers recommended are Ocusoft Oust foam cleanser to cleanse eyelids/lashes at night and in the am. Other options are Blephadex or Cliradex eyelid wipes.  KEEP EYES CLOSED when  using these products.  These can be purchased on amazon.com   A good product for make up remover with tea tree oil is BlueSpace.  This can be found at www.[employer] or [employer].    Other good eyelid cleansers have hypochlorous which removes excess bacteria and is safe around the eyes. Products are Avenova, Ocusoft Hypochlor or Heyedrate. Spray solution onto cotton pad, close eyes and gently apply to eyelids and eyelashes using side to side motion.  You can also KEEP EYES CLOSED spray and rub into eyelashes.  You do not need to rinse it off. Use morning and evening. These products can be found on Amazon.  You can check with your local pharmacy and see if they can order if for you if they don't have it.    Other brands of eyelid cleansing wipes are:    Ocusoft wipes  Systane wipes    A great eye make up line is [email or web address]        Optometry Providers       Clinic Locations                                 Telephone Number   Dr. Cora Chaparro    [place]   [facility] [phone]     [facility] Hours:                [place] [facility] Hours:       [place] [facility] Hours:   [address] [facility] [address]   [address]     [address] [place] [address]   [place] [address]    [place] [address]  Phone: [phone]                    Phone: [phone]     Phone: [phone]                      Monday 8:00-6:00                          Monday 8:00-6:00                          Monday 8:00-6:00              Tuesday 8:00-6:00                          Tuesday 8:00-6:00                          Tuesday 8:00-6:00              Wednesday 8:00-6:00                  Wednesday 8:00-6:00                   Wednesday 8:00-6:00      Thursday 8:00-6:00                        Thursday 8:00-6:00                         Thursday 8:00-6:00            Friday 8:00-5:00                               Friday 8:00-5:00                              Friday 8:00-5:00    [facility] Hours:   [address] [place], [place] [id number] [phone]    Monday 9:00-6:00  Tuesday 9:00-6:00  Wednesday 9:00-6:00  Thursday 9:00-6:00  Friday 9:00-5:00  Please log on to [email or web address] to order your contact lenses.  The link is found on the [facility] page.  As always, Thank you for trusting us with your health care needs!

## 2022-09-10 ENCOUNTER — HEALTH MAINTENANCE LETTER (OUTPATIENT)
Age: 47
End: 2022-09-10

## 2023-01-02 ENCOUNTER — TELEPHONE (OUTPATIENT)
Dept: DERMATOLOGY | Facility: CLINIC | Age: 48
End: 2023-01-02

## 2023-01-02 DIAGNOSIS — L71.8 GRANULOMATOUS ROSACEA: ICD-10-CM

## 2023-01-02 DIAGNOSIS — L71.9 ACNE ROSACEA: ICD-10-CM

## 2023-01-02 NOTE — TELEPHONE ENCOUNTER
Patient is requesting a 45 quantity refill of the Doxycycline Monohydrate 100mg Caps. Received this communication via [facility] [phone]. Previous [place] pt but has seen Noe in June. Thank you!    Pravin Ordonez  In Clinic Visit Facilitator

## 2023-01-03 RX ORDER — DOXYCYCLINE 100 MG/1
CAPSULE ORAL
Qty: 45 CAPSULE | Refills: 0 | Status: SHIPPED | OUTPATIENT
Start: 2023-01-03 | End: 2023-02-16

## 2023-01-03 NOTE — TELEPHONE ENCOUNTER
"  doxycycline monohydrate (MONODOX) 100 MG capsule  Last Written Prescription Date:6/14/22  Last Fill Quantity:45   # refills: 0  Last Office Visit : 6/14/22  MG  Future Office visit:  None     \"   Return in about 3 months (around 9/14/2022) for Acne\"      Process 4    \"Continue 100mg doxycycline every 2-3 days.sun sensitivity\"          "

## 2023-01-03 NOTE — TELEPHONE ENCOUNTER
1/3/2023 Called patient and left voicemail. Provided [phone] to schedule.     Aline rios Procedure   Dermotology, Surgery, Urology  [facility]

## 2023-01-09 NOTE — TELEPHONE ENCOUNTER
1/9 Called patient (2nd attempt) left voicemail. Provided [phone] to schedule.     Aline rios Procedure   Dermatology, Surgery, Urology  [facility]

## 2023-02-16 ENCOUNTER — OFFICE VISIT (OUTPATIENT)
Dept: DERMATOLOGY | Facility: CLINIC | Age: 48
End: 2023-02-16
Payer: COMMERCIAL

## 2023-02-16 DIAGNOSIS — L71.9 ACNE ROSACEA: ICD-10-CM

## 2023-02-16 DIAGNOSIS — L71.8 GRANULOMATOUS ROSACEA: ICD-10-CM

## 2023-02-16 PROCEDURE — 99213 OFFICE O/P EST LOW 20 MIN: CPT | Performed by: DERMATOLOGY

## 2023-02-16 RX ORDER — PERMETHRIN 50 MG/G
CREAM TOPICAL
Qty: 60 G | Refills: 2 | Status: SHIPPED | OUTPATIENT
Start: 2023-02-16 | End: 2023-08-17

## 2023-02-16 RX ORDER — LISINOPRIL 10 MG/1
10 TABLET ORAL DAILY
COMMUNITY

## 2023-02-16 RX ORDER — DOXYCYCLINE 100 MG/1
CAPSULE ORAL
Qty: 45 CAPSULE | Refills: 1 | Status: SHIPPED | OUTPATIENT
Start: 2023-02-16 | End: 2023-05-10

## 2023-02-16 NOTE — PROGRESS NOTES
[facility] Dermatology Note  Encounter Date: Feb 16, 2023  Office Visit     Dermatology Problem List:  1. Rosacea, possibly granulomatous or acne rosacea clears with doxy, recurs with stopping.   - Current tx: metrocream 0.75% cream once daily, permethrin 5% cream once a day, OTC azelaic acid cream once daily, doxycycline 100 mg every 2-3 days  - hx of azelaic acid 15% gel, minocycline (took only 3-4 days due to stomach upset), soolantra (cost prohibitive), doxy 40mg (cost prohibitive), ivermectin 15 mg once monthly x3 months (no improvement), doxy once weekly (flares)  2. Eczema and seb derm, posterior neck  - current tx: triamcinolone 0.1% ointment BID     Social history:  with one child (11 years old), no plans for more children. Has an IUD to prevent pregnancy,  with vasectomy. Works in [employer].  ____________________________________________     Assessment & Plan:      # Rosacea, appears to be severe acne rosacea, consider granulomatous.   Patient reports improvement from prior, will continue with current regimen and reassess in 6 months by phone. Refilled medications. Had visited GI for hemachromatosis, unconfirmed. Denies any side effects.  - Continue doxycycline 100 mg every 2-3 days. Refilled.  - Continue permethrin 5% cream once daily. Refilled.   - Continue metrocream 0.75% cream to be used once daily. Refilled.  - Continue OTC azelaic acid cream once daily.   - Future consideration: oral isotretinoin as an alternative oral treatment       Procedures Performed:   None.    Follow-up: 6 month(s) virtually (telephone with photos) for follow up, or earlier for new or changing lesions    Staff and Scribe:     Scribe Disclosure:   Chago COLLINS, am serving as a scribe to document services personally performed by this physician, Dr. Mere Liu, based on data collection and the provider's statements to me.       Provider Disclosure:   The documentation recorded by the  jennyfer accurately reflects the services I personally performed and the decisions made by me.    Mere Liu MD    Department of Dermatology  [facility]: Phone: [phone], Fax:[phone]  [facility]: Phone: [phone], Fax: [phone]    ____________________________________________    CC: Rosacea (Patient here for follow up on Rosacea, rosacea is improving)    HPI:  Ms. Nichole Berkowitz is a(n) 47 year old female who presents today as a return patient for rosacea follow up.    Last seen 6/14/22 for severe acne rosacea. At that time, patient was instructed to continue doxycycline 100 mg every 2-3 days, continue permethrin 5% cream daily, continue metrocream 0.75% daily, and continue azelaic acid once daily.     Today, she feels her rosacea is doing well.     Patient is otherwise feeling well, without additional skin concerns.    Labs Reviewed:  N/A    Physical Exam:  Vitals: There were no vitals taken for this visit.  SKIN: Focused examination of face, neck was performed.  - moderate erythema on the nose and cehe  - 2 faint papules left cheek and chin.  - telangiectasias on the nose and cheeks, and neck.   - No other lesions of concern on areas examined.     Medications:  Current Outpatient Medications   Medication     azelaic acid (FINACIA) 15 % external gel     doxycycline monohydrate (MONODOX) 100 MG capsule     lisinopril (ZESTRIL) 10 MG tablet     metroNIDAZOLE (METROCREAM) 0.75 % external cream     permethrin (ELIMITE) 5 % external cream     No current facility-administered medications for this visit.      Past Medical History:   Patient Active Problem List   Diagnosis     Acne rosacea     Melasma     Chronic seasonal allergic rhinitis due to pollen     Family history of hemochromatosis     Past Medical History:   Diagnosis Date     Hypertension         CC No referring  provider defined for this encounter. on close of this encounter.

## 2023-02-16 NOTE — PATIENT INSTRUCTIONS
[facility] Dermatology Visit    Thank you for allowing us to participate in your care. Your findings, instructions and follow-up plan are as follows:              When should I call my doctor?  If you are worsening or not improving, please, contact us or seek urgent care as noted below.     Who should I call with questions (adults)?  [facility] (adult and pediatric): [phone]  [facility] (adult): [phone]  For urgent needs outside of business hours call the [facility] at [phone] and ask for the dermatology resident on call  If this is a medical emergency and you are unable to reach an ER, Call 911    Who should I call with questions (pediatric)?  [facility]- Pediatric Dermatology  Dr. Carmela Mcconnell, Dr. Maliha Sotelo, Dr. Kate Dwyer, Brandy Lawrence, PA  Dr. Flavia Guzman, Dr. Caitlin Lechuga & Dr. Reginaldo Hardin  Non Urgent  Nurse Triage Line; [phone]- Dolly and Erlinda PAIGE Care Coordinators   [place] (/Complex ) [phone]    If you need a prescription refill, please contact your pharmacy. Refills are approved or denied by our physicians during normal business hours, Monday through Fridays  Per office policy, refills will not be granted if you have not been seen within the past year (or sooner depending on your child's condition).    Scheduling Information:  Pediatric Appointment Scheduling and Call Center [phone]  Radiology Scheduling- [phone]  Sedation Unit Scheduling- [phone]  [place] Scheduling- General [phone]; Pediatric Dermatology [phone]  Main  Services: [phone]  Icelandic: [phone]  German: [phone]  Hmong/German/Lester: [phone]  Preadmission Nursing Department Fax Number: [phone] (fax all pre-operative paperwork to this number)    For urgent matters arising during evenings, weekends,  or holidays that cannot wait for normal business hours please call [phone] and ask for the dermatology resident on call to be paged.

## 2023-02-16 NOTE — NURSING NOTE
Nichole Berkowitz's goals for this visit include:   Chief Complaint   Patient presents with     Rosacea     Patient here for follow up on Rosacea, rosacea is improving        She requests these members of her care team be copied on today's visit information:     PCP: Sea Hernandez    Referring Provider:  No referring provider defined for this encounter.    There were no vitals taken for this visit.    Do you need any medication refills at today's visit? No       Mame Pearl EMT

## 2023-02-16 NOTE — LETTER
2/16/2023         RE: Nichole Berkowitz  [address]  [place] [address]        Dear Colleague,    Thank you for referring your patient, Nichole Berkowitz, to the [facility]. Please see a copy of my visit note below.    [facility] Dermatology Note  Encounter Date: Feb 16, 2023  Office Visit     Dermatology Problem List:  1. Rosacea, possibly granulomatous or acne rosacea clears with doxy, recurs with stopping.   - Current tx: metrocream 0.75% cream once daily, permethrin 5% cream once a day, OTC azelaic acid cream once daily, doxycycline 100 mg every 2-3 days  - hx of azelaic acid 15% gel, minocycline (took only 3-4 days due to stomach upset), soolantra (cost prohibitive), doxy 40mg (cost prohibitive), ivermectin 15 mg once monthly x3 months (no improvement), doxy once weekly (flares)  2. Eczema and seb derm, posterior neck  - current tx: triamcinolone 0.1% ointment BID     Social history:  with one child (11 years old), no plans for more children. Has an IUD to prevent pregnancy,  with vasectomy. Works in probation.  ____________________________________________     Assessment & Plan:      # Rosacea, appears to be severe acne rosacea, consider granulomatous.   Patient reports improvement from prior, will continue with current regimen and reassess in 6 months by phone. Refilled medications. Had visited GI for hemachromatosis, unconfirmed. Denies any side effects.  - Continue doxycycline 100 mg every 2-3 days. Refilled.  - Continue permethrin 5% cream once daily. Refilled.   - Continue metrocream 0.75% cream to be used once daily. Refilled.  - Continue OTC azelaic acid cream once daily.   - Future consideration: oral isotretinoin as an alternative oral treatment       Procedures Performed:   None.    Follow-up: 6 month(s) virtually (telephone with photos) for follow up, or earlier for new or changing lesions    Staff and Scribe:      Scribe Disclosure:   I, Chago Baker, am serving as a scribe to document services personally performed by this physician, Dr. Mere Liu, based on data collection and the provider's statements to me.       Provider Disclosure:   The documentation recorded by the scribe accurately reflects the services I personally performed and the decisions made by me.    Mere Liu MD    Department of Dermatology  [facility]: Phone: [phone], Fax:[phone]  [facility]: Phone: [phone], Fax: [phone]    ____________________________________________    CC: Rosacea (Patient here for follow up on Rosacea, rosacea is improving)    HPI:  Ms. Nichole Brekowitz is a(n) 47 year old female who presents today as a return patient for rosacea follow up.    Last seen 6/14/22 for severe acne rosacea. At that time, patient was instructed to continue doxycycline 100 mg every 2-3 days, continue permethrin 5% cream daily, continue metrocream 0.75% daily, and continue azelaic acid once daily.     Today, she feels her rosacea is doing well.     Patient is otherwise feeling well, without additional skin concerns.    Labs Reviewed:  N/A    Physical Exam:  Vitals: There were no vitals taken for this visit.  SKIN: Focused examination of face, neck was performed.  - moderate erythema on the nose and cehe  - 2 faint papules left cheek and chin.  - telangiectasias on the nose and cheeks, and neck.   - No other lesions of concern on areas examined.     Medications:  Current Outpatient Medications   Medication     azelaic acid (FINACIA) 15 % external gel     doxycycline monohydrate (MONODOX) 100 MG capsule     lisinopril (ZESTRIL) 10 MG tablet     metroNIDAZOLE (METROCREAM) 0.75 % external cream     permethrin (ELIMITE) 5 % external cream     No current facility-administered medications for this visit.       Past Medical History:   Patient Active Problem List   Diagnosis     Acne rosacea     Melasma     Chronic seasonal allergic rhinitis due to pollen     Family history of hemochromatosis     Past Medical History:   Diagnosis Date     Hypertension         CC No referring provider defined for this encounter. on close of this encounter.       Again, thank you for allowing me to participate in the care of your patient.        Sincerely,        Mere Liu MD

## 2023-04-30 ENCOUNTER — HEALTH MAINTENANCE LETTER (OUTPATIENT)
Age: 48
End: 2023-04-30

## 2023-05-09 ENCOUNTER — MYC MEDICAL ADVICE (OUTPATIENT)
Dept: DERMATOLOGY | Facility: CLINIC | Age: 48
End: 2023-05-09
Payer: COMMERCIAL

## 2023-05-09 DIAGNOSIS — L71.9 ACNE ROSACEA: ICD-10-CM

## 2023-05-09 DIAGNOSIS — L71.8 GRANULOMATOUS ROSACEA: ICD-10-CM

## 2023-05-10 RX ORDER — DOXYCYCLINE 100 MG/1
CAPSULE ORAL
Qty: 45 CAPSULE | Refills: 2 | Status: SHIPPED | OUTPATIENT
Start: 2023-05-10 | End: 2023-08-17

## 2023-05-10 NOTE — TELEPHONE ENCOUNTER
doxycycline monohydrate (MONODOX) 100 MG capsule  Last Written Prescription Date:   2/16/2023  Last Fill Quantity: 45,   # refills: 1  Last Office Visit :  2/16/2023  Future Office visit:   8/17/2023  45 Caps, 2 Refills sent to pharm for Pt care.       Jaleesa Stevens RN  Central Triage Red Flags/Med Refills

## 2023-06-01 ENCOUNTER — MYC MEDICAL ADVICE (OUTPATIENT)
Dept: DERMATOLOGY | Facility: CLINIC | Age: 48
End: 2023-06-01
Payer: COMMERCIAL

## 2023-06-01 DIAGNOSIS — L71.8 GRANULOMATOUS ROSACEA: ICD-10-CM

## 2023-06-01 DIAGNOSIS — L71.9 ACNE ROSACEA: ICD-10-CM

## 2023-06-01 NOTE — TELEPHONE ENCOUNTER
metroNIDAZOLE (METROCREAM) 0.75 % external cream    Remaining refills sent to requested pharmacy. Per WO, per my chart request.

## 2023-06-03 ENCOUNTER — HEALTH MAINTENANCE LETTER (OUTPATIENT)
Age: 48
End: 2023-06-03

## 2023-08-11 NOTE — PROGRESS NOTES
[facility] Dermatology Note  Encounter Date: Aug 17, 2023  Store-and-Forward and Telephone ([phone]). Location of teledermatologist: [facility].  Start time: 5:23pm. End time: 5:28pm.    Dermatology Problem List:  1. Rosacea, possibly granulomatous or acne rosacea clears with doxy, recurs with stopping.   - Current tx: metrocream 0.75% cream once daily, permethrin 5% cream once a day, OTC azelaic acid cream once daily, doxycycline 100 mg every 2-3 days  - hx of azelaic acid 15% gel, minocycline (took only 3-4 days due to stomach upset), soolantra (cost prohibitive), doxy 40mg (cost prohibitive), ivermectin 15 mg once monthly x3 months (no improvement), doxy once weekly (flares)  2. Eczema and seb derm, posterior neck  - current tx: triamcinolone 0.1% ointment BID     Social history:  with one child (11 years old), no plans for more children. Has an IUD to prevent pregnancy,  with vasectomy. Works in [employer].    ____________________________________________    Assessment & Plan:     # Rosacea, consider granulomatous but is severe. He was on doxy, permethrin cream, metro cream and azelaic acid cream and doing well, long term plan was accutane  -doxycline for flares.   -no change to metrocream, permethrin cream and azelaic acid cream  -accutane recommended, declined    Procedures Performed:    None    Follow-up: 3 month(s) virtually (telephone with photos), or earlier for new or changing lesions    Staff and Scribe:     Scribe Disclosure:   I, Danielle Braden, am serving as a scribe to document services personally performed by this physician based on data collection and the provider's statements to me.       Provider Disclosure:   The documentation recorded by the scribe accurately reflects the services I personally performed and the decisions made by me.    Mere Liu MD    Department of Dermatology  [facility]  [facility]: Phone: [phone], Fax:[phone]  [facility]: Phone: [phone], Fax: [phone]   ____________________________________________    CC: RECHECK    HPI:  Ms. Nichole Berkowitz is a(n) 48 year old female who presents today as a return patient for acne    Last seen on 2/16/23 for rosacea.     Today, she would like to stick with topicals    No headaches.     Patient is otherwise feeling well, without additional skin concerns.    Labs Reviewed:  NA    Physical Exam:  Vitals: There were no vitals taken for this visit.  SKIN: Teledermatology photos were reviewed; image quality and interpretability: =acceptable. Image date:  8/17/2023  - telangiectasias on the bilateral cheeks with erytema of nose, cheeks, chin. Aso nodules noted on the bilateral cheeks  - No other lesions of concern on areas examined.     Medications:  Current Outpatient Medications   Medication    azelaic acid (FINACIA) 15 % external gel    doxycycline monohydrate (MONODOX) 100 MG capsule    ivermectin (SOOLANTRA) 1 % cream    metroNIDAZOLE (METROCREAM) 0.75 % external cream    permethrin (ELIMITE) 5 % external cream    lisinopril (ZESTRIL) 10 MG tablet     No current facility-administered medications for this visit.      Past Medical/Surgical History:   Patient Active Problem List   Diagnosis    Acne rosacea    Melasma    Chronic seasonal allergic rhinitis due to pollen    Family history of hemochromatosis     Past Medical History:   Diagnosis Date    Hypertension        CC No referring provider defined for this encounter. on close of this encounter.

## 2023-08-17 ENCOUNTER — VIRTUAL VISIT (OUTPATIENT)
Dept: DERMATOLOGY | Facility: CLINIC | Age: 48
End: 2023-08-17
Payer: COMMERCIAL

## 2023-08-17 DIAGNOSIS — L71.8 GRANULOMATOUS ROSACEA: ICD-10-CM

## 2023-08-17 DIAGNOSIS — L71.9 ACNE ROSACEA: Primary | ICD-10-CM

## 2023-08-17 PROCEDURE — 99441 PR PHYSICIAN TELEPHONE EVALUATION 5-10 MIN: CPT | Performed by: DERMATOLOGY

## 2023-08-17 RX ORDER — PERMETHRIN 50 MG/G
CREAM TOPICAL
Qty: 60 G | Refills: 2 | Status: SHIPPED | OUTPATIENT
Start: 2023-08-17 | End: 2023-12-04

## 2023-08-17 RX ORDER — IVERMECTIN 10 MG/G
CREAM TOPICAL
Qty: 45 G | Refills: 11 | Status: SHIPPED | OUTPATIENT
Start: 2023-08-17 | End: 2023-12-04

## 2023-08-17 RX ORDER — DOXYCYCLINE 100 MG/1
CAPSULE ORAL
Qty: 45 CAPSULE | Refills: 2 | Status: SHIPPED | OUTPATIENT
Start: 2023-08-17 | End: 2023-12-04

## 2023-08-17 RX ORDER — AZELAIC ACID 0.15 G/G
GEL TOPICAL
Qty: 50 G | Refills: 8 | Status: SHIPPED | OUTPATIENT
Start: 2023-08-17 | End: 2023-12-04

## 2023-08-17 ASSESSMENT — PAIN SCALES - GENERAL: PAINLEVEL: NO PAIN (0)

## 2023-08-17 NOTE — NURSING NOTE
Teledermatology Nurse Call Patients:     Are you in the [facility] at the time of the encounter? yes    Today's visit will be billed to you and your insurance.    A teledermatology visit is not as thorough as an in-person visit and the quality of the photograph sent may not be of the same quality as that taken by the dermatology clinic.    Chief Complaint   Patient presents with    RECHECK     *Unable to verify pt's insurance. Pt did confirm she has active insurance. Pt was given registration's phone number and advised to call them to get insurance info updated.

## 2023-08-17 NOTE — LETTER
8/17/2023         RE: Nichole Berkowitz  [address] [place] [address]        Dear Colleague,    Thank you for referring your patient, Nichole Berkowitz, to the [facility]. Please see a copy of my visit note below.    [facility] Dermatology Note  Encounter Date: Aug 17, 2023  Store-and-Forward and Telephone ([phone]). Location of teledermatologist: [facility].  Start time: 5:23pm. End time: 5:28pm.    Dermatology Problem List:  1. Rosacea, possibly granulomatous or acne rosacea clears with doxy, recurs with stopping.   - Current tx: metrocream 0.75% cream once daily, permethrin 5% cream once a day, OTC azelaic acid cream once daily, doxycycline 100 mg every 2-3 days  - hx of azelaic acid 15% gel, minocycline (took only 3-4 days due to stomach upset), soolantra (cost prohibitive), doxy 40mg (cost prohibitive), ivermectin 15 mg once monthly x3 months (no improvement), doxy once weekly (flares)  2. Eczema and seb derm, posterior neck  - current tx: triamcinolone 0.1% ointment BID     Social history:  with one child (11 years old), no plans for more children. Has an IUD to prevent pregnancy,  with vasectomy. Works in probation.    ____________________________________________    Assessment & Plan:     # Rosacea, consider granulomatous but is severe. He was on doxy, permethrin cream, metro cream and azelaic acid cream and doing well, long term plan was accutane  -doxycline for flares.   -no change to metrocream, permethrin cream and azelaic acid cream  -accutane recommended, declined    Procedures Performed:    None    Follow-up: 3 month(s) virtually (telephone with photos), or earlier for new or changing lesions    Staff and Scribe:     Scribe Disclosure:   I, Danielle Braden, am serving as a scribe to document services personally performed by this physician based on data collection and the provider's  statements to me.       Provider Disclosure:   The documentation recorded by the scribe accurately reflects the services I personally performed and the decisions made by me.    Meer Liu MD    Department of Dermatology  [facility]: Phone: [phone], Fax:[phone]  [facility]: Phone: [phone], Fax: [phone]   ____________________________________________    CC: RECHECK    HPI:  Ms. Nichole Berkowitz is a(n) 48 year old female who presents today as a return patient for acne    Last seen on 2/16/23 for rosacea.     Today, she would like to stick with topicals    No headaches.     Patient is otherwise feeling well, without additional skin concerns.    Labs Reviewed:  NA    Physical Exam:  Vitals: There were no vitals taken for this visit.  SKIN: Teledermatology photos were reviewed; image quality and interpretability: =acceptable. Image date:  8/17/2023  - telangiectasias on the bilateral cheeks with erytema of nose, cheeks, chin. Aso nodules noted on the bilateral cheeks  - No other lesions of concern on areas examined.     Medications:  Current Outpatient Medications   Medication     azelaic acid (FINACIA) 15 % external gel     doxycycline monohydrate (MONODOX) 100 MG capsule     ivermectin (SOOLANTRA) 1 % cream     metroNIDAZOLE (METROCREAM) 0.75 % external cream     permethrin (ELIMITE) 5 % external cream     lisinopril (ZESTRIL) 10 MG tablet     No current facility-administered medications for this visit.      Past Medical/Surgical History:   Patient Active Problem List   Diagnosis     Acne rosacea     Melasma     Chronic seasonal allergic rhinitis due to pollen     Family history of hemochromatosis     Past Medical History:   Diagnosis Date     Hypertension        CC No referring provider defined for this encounter. on close of this encounter.      Again, thank you for  allowing me to participate in the care of your patient.        Sincerely,        Mere Liu MD

## 2023-08-17 NOTE — PATIENT INSTRUCTIONS
[facility] Dermatology Visit    Thank you for allowing us to participate in your care. Your findings, instructions and follow-up plan are as follows:       Soolantra once daily if covered and hold permethrin      When should I call my doctor?  If you are worsening or not improving, please, contact us or seek urgent care as noted below.     Who should I call with questions (adults)?  [facility] (adult and pediatric): [phone]  [facility] (adult): [phone]  For urgent needs outside of business hours call the [facility] at [phone] and ask for the dermatology resident on call  If this is a medical emergency and you are unable to reach an ER, Call [phone]    Who should I call with questions (pediatric)?  [facility]- Pediatric Dermatology  Dr. Carmela Mcconnell, Dr. Maliha Sotelo, Dr. Kate Dwyer, Brandy Lawrence, PA  Dr. Flavia Guzmna, Dr. Caitlin Lechuga & Dr. Reginaldo Hardin  Non Urgent  Nurse Triage Line; [phone]- Dolly and Erlinda PAIGE Care Coordinators   [place] (/Complex ) [phone]    If you need a prescription refill, please contact your pharmacy. Refills are approved or denied by our physicians during normal business hours, Monday through Fridays  Per office policy, refills will not be granted if you have not been seen within the past year (or sooner depending on your child's condition).    Scheduling Information:  Pediatric Appointment Scheduling and Call Center [phone]  Radiology Scheduling- [phone]  Sedation Unit Scheduling- [phone]  [place] Scheduling- General [phone]; Pediatric Dermatology [phone]  Main  Services: [phone]  Frisian: [phone]  Citizen of [place]: [phone]  Hmong/[place]/Lester: [phone]  Preadmission Nursing Department Fax Number: [phone] (fax all pre-operative paperwork to this number)    For  urgent matters arising during evenings, weekends, or holidays that cannot wait for normal business hours please call [phone] and ask for the dermatology resident on call to be paged.

## 2023-08-18 ENCOUNTER — TELEPHONE (OUTPATIENT)
Dept: DERMATOLOGY | Facility: CLINIC | Age: 48
End: 2023-08-18
Payer: COMMERCIAL

## 2023-08-18 NOTE — TELEPHONE ENCOUNTER
Left Voicemail (1st Attempt) for the patient to call back and schedule the following:    Appointment type: Telephone return  Provider: Dr. Liu  Return date: 11/17/2023  Specialty phone number: [phone]  Additonal Notes: 3 month(s) virtually (telephone with photos), or earlier for new or changing lesions     Aline rios Procedure   [facility]ermatology, Surgery, U[facility]  [facility]

## 2023-11-30 ENCOUNTER — OFFICE VISIT (OUTPATIENT)
Dept: OPTOMETRY | Facility: CLINIC | Age: 48
End: 2023-11-30
Payer: COMMERCIAL

## 2023-11-30 DIAGNOSIS — H52.4 PRESBYOPIA: ICD-10-CM

## 2023-11-30 DIAGNOSIS — H52.223 REGULAR ASTIGMATISM OF BOTH EYES: ICD-10-CM

## 2023-11-30 DIAGNOSIS — Z98.890 HX OF LASIK: ICD-10-CM

## 2023-11-30 DIAGNOSIS — H10.13 ALLERGIC CONJUNCTIVITIS OF BOTH EYES: ICD-10-CM

## 2023-11-30 DIAGNOSIS — Z01.00 EXAMINATION OF EYES AND VISION: Primary | ICD-10-CM

## 2023-11-30 DIAGNOSIS — H02.889 MEIBOMIAN GLAND DYSFUNCTION: ICD-10-CM

## 2023-11-30 DIAGNOSIS — H52.13 MYOPIA OF BOTH EYES: ICD-10-CM

## 2023-11-30 DIAGNOSIS — H04.123 DRY EYE SYNDROME OF BOTH EYES: ICD-10-CM

## 2023-11-30 PROCEDURE — 92015 DETERMINE REFRACTIVE STATE: CPT | Performed by: OPTOMETRIST

## 2023-11-30 PROCEDURE — 92014 COMPRE OPH EXAM EST PT 1/>: CPT | Performed by: OPTOMETRIST

## 2023-11-30 RX ORDER — LIFITEGRAST 50 MG/ML
1 SOLUTION/ DROPS OPHTHALMIC 2 TIMES DAILY
Qty: 12 EACH | Refills: 11 | Status: SHIPPED | OUTPATIENT
Start: 2023-11-30 | End: 2024-11-29

## 2023-11-30 ASSESSMENT — VISUAL ACUITY
OS_CC: 20/20
OD_CC: 20/20
CORRECTION_TYPE: GLASSES
OD_SC: 20/30
OS_SC: 20/30-1
OS_SC+: -2
OD_SC: 20/30-1
METHOD: SNELLEN - LINEAR
OS_SC: 20/30

## 2023-11-30 ASSESSMENT — REFRACTION_WEARINGRX
OD_CYLINDER: SPHERE
OS_CYLINDER: +0.50
OS_SPHERE: -1.25
OD_CYLINDER: SPHERE
OS_CYLINDER: +0.50
OS_AXIS: 035
SPECS_TYPE: SVL
OS_SPHERE: -1.25
OD_SPHERE: -1.25
OS_AXIS: 035
OD_SPHERE: -1.25

## 2023-11-30 ASSESSMENT — REFRACTION_MANIFEST
OS_ADD: +2.25
OS_AXIS: 035
OS_SPHERE: -1.25
OS_CYLINDER: +0.50
OD_CYLINDER: SPHERE
OD_ADD: +2.25
OD_SPHERE: -1.25

## 2023-11-30 ASSESSMENT — SLIT LAMP EXAM - LIDS
COMMENTS: MEIBOMIAN GLAND DYSFUNCTION
COMMENTS: MEIBOMIAN GLAND DYSFUNCTION

## 2023-11-30 ASSESSMENT — CONF VISUAL FIELD
OD_NORMAL: 1
OS_SUPERIOR_TEMPORAL_RESTRICTION: 0
OS_INFERIOR_TEMPORAL_RESTRICTION: 0
OS_INFERIOR_NASAL_RESTRICTION: 0
OS_SUPERIOR_NASAL_RESTRICTION: 0
OD_INFERIOR_TEMPORAL_RESTRICTION: 0
OD_SUPERIOR_NASAL_RESTRICTION: 0
OD_SUPERIOR_TEMPORAL_RESTRICTION: 0
OD_INFERIOR_NASAL_RESTRICTION: 0
OS_NORMAL: 1

## 2023-11-30 ASSESSMENT — CUP TO DISC RATIO
OS_RATIO: 0.1
OD_RATIO: 0.1

## 2023-11-30 ASSESSMENT — KERATOMETRY
OD_K2POWER_DIOPTERS: 39.50
OD_K1POWER_DIOPTERS: 39.25
OD_AXISANGLE_DEGREES: 032
OS_AXISANGLE_DEGREES: 129
OS_AXISANGLE2_DEGREES: 039
OS_K1POWER_DIOPTERS: 39.00
OD_AXISANGLE2_DEGREES: 122
OS_K2POWER_DIOPTERS: 40.00

## 2023-11-30 ASSESSMENT — TONOMETRY
OS_IOP_MMHG: 15
OD_IOP_MMHG: 19
IOP_METHOD: TONOPEN

## 2023-11-30 ASSESSMENT — EXTERNAL EXAM - LEFT EYE: OS_EXAM: ROSACEA

## 2023-11-30 ASSESSMENT — EXTERNAL EXAM - RIGHT EYE: OD_EXAM: ROSACEA

## 2023-11-30 NOTE — PROGRESS NOTES
Chief Complaint   Patient presents with    Annual Eye Exam      Accompanied by  and son  Last Eye Exam: 6-  Dilated Previously: Yes    What are you currently using to see?  glasses       Distance Vision Acuity: Satisfied with vision    Near Vision Acuity: Satisfied with vision while reading  unaided    Eye Comfort: dry  Do you use eye drops? : Yes: refresh drops  Occupation or Hobbies:      History of Lasik both eyes     Isa Xavier Optometric Assistant, A.B.O.C.      Medical, surgical and family histories reviewed and updated 11/30/2023.       OBJECTIVE: See Ophthalmology exam    ASSESSMENT:    ICD-10-CM    1. Examination of eyes and vision  Z01.00 EYE EXAM (SIMPLE-NONBILLABLE)      2. Myopia of both eyes  H52.13 REFRACTION      3. Regular astigmatism of both eyes  H52.223 REFRACTION      4. Presbyopia  H52.4 REFRACTION      5. Meibomian gland dysfunction  H02.889 EYE EXAM (SIMPLE-NONBILLABLE)      6. Allergic conjunctivitis of both eyes  H10.13 EYE EXAM (SIMPLE-NONBILLABLE)      7. Hx of LASIK  Z98.890 EYE EXAM (SIMPLE-NONBILLABLE)      8. Dry eye syndrome of both eyes  H04.123 lifitegrast (XIIDRA) 5 % opthalmic solution          PLAN:     Patient Instructions   Eyeglass prescription given.  Glasses for distance vision only.  No change in eyeglass prescription.     OTC readers +1.00 as needed for small print.     Continue eyelid cleansing at night with tea tree oil foam.     Heat to the eyes daily for 10-15 minutes nightly with warm washcloth or reusable gel masks from the pharmacy or  Flavours heat masks can be purchased at [employer].    Xiidra- (Liftegras ophthalmic solution 5 %) 1 drop both eyes 2 x day.     Artificial tears- 1 drop both eyes 2-4 x daily.      OTC Pataday to be used once or twice daily for itchy eyes.  Use as needed.     Return in 1 year for a complete eye exam and dilated fundus exam or sooner if needed.     Josiah Armstrong, OD

## 2023-11-30 NOTE — PATIENT INSTRUCTIONS
Eyeglass prescription given.  Glasses for distance vision only.  No change in eyeglass prescription.     OTC readers +1.00 as needed for small print.     Continue eyelid cleansing at night with tea tree oil foam.     Heat to the eyes daily for 10-15 minutes nightly with warm washcloth or reusable gel masks from the pharmacy or  Isidoro heat masks can be purchased at [employer].    Xiidra- (Liftegras ophthalmic solution 5 %) 1 drop both eyes 2 x day.     Artificial tears- 1 drop both eyes 2-4 x daily.      OTC Pataday to be used once or twice daily for itchy eyes.  Use as needed.     Return in 1 year for a complete eye exam and dilated fundus exam or sooner if needed.     Josiah Armstrong, OD    The affects of the dilating drops last for 4- 6 hours.  You will be more sensitive to light and vision will be blurry up close.  Do not drive if you do not feel comfortable.  Mydriatic sunglasses were given if needed.      Optometry Providers       Clinic Locations                                 Telephone Number   Dr. Cora Pham [place]    [facility] [phone]     [facility] Hours:                [facility] Hours:       [facility] Hours:   [address]   [address]      [address] [place] [address]   [place] [address]    [place] [address]  Phone: [phone]                    Phone: [phone]     Phone: [phone]                      Monday 8:00-6:00                          Monday 8:00-6:00                          Monday 8:00-6:00              Tuesday 8:00-6:00                          Tuesday 8:00-6:00                          Tuesday 8:00-6:00              Wednesday 8:00-6:00                  Wednesday 8:00-6:00                   Wednesday 8:00-6:00      Thursday 8:00-6:00                        Thursday 8:00-6:00                          Thursday 8:00-6:00            Friday 8:00-5:00                              Friday 8:00-5:00                              Friday 8:00-5:00    [facility] Hours:   [address] [place] [address]  [phone]    Monday 9:00-6:00  Tuesday 9:00-6:00  Wednesday 9:00-6:00  Thursday 9:00-6:00  Friday 9:00-5:00  As always, Thank you for trusting us with your health care needs!

## 2023-11-30 NOTE — LETTER
11/30/2023         RE: Nichole Berkowitz  [address]  [facility] [address]        Dear Colleague,    Thank you for referring your patient, Nichole Berkowitz, to the [facility]. Please see a copy of my visit note below.    Chief Complaint   Patient presents with     Annual Eye Exam      Accompanied by  and son  Last Eye Exam: 6-  Dilated Previously: Yes    What are you currently using to see?  glasses       Distance Vision Acuity: Satisfied with vision    Near Vision Acuity: Satisfied with vision while reading  unaided    Eye Comfort: dry  Do you use eye drops? : Yes: refresh drops  Occupation or Hobbies:      History of Lasik both eyes     Isa Xavier Optometric Assistant, A.B.O.C.      Medical, surgical and family histories reviewed and updated 11/30/2023.       OBJECTIVE: See Ophthalmology exam    ASSESSMENT:    ICD-10-CM    1. Examination of eyes and vision  Z01.00 EYE EXAM (SIMPLE-NONBILLABLE)      2. Myopia of both eyes  H52.13 REFRACTION      3. Regular astigmatism of both eyes  H52.223 REFRACTION      4. Presbyopia  H52.4 REFRACTION      5. Meibomian gland dysfunction  H02.889 EYE EXAM (SIMPLE-NONBILLABLE)      6. Allergic conjunctivitis of both eyes  H10.13 EYE EXAM (SIMPLE-NONBILLABLE)      7. Hx of LASIK  Z98.890 EYE EXAM (SIMPLE-NONBILLABLE)      8. Dry eye syndrome of both eyes  H04.123 lifitegrast (XIIDRA) 5 % opthalmic solution          PLAN:     Patient Instructions   Eyeglass prescription given.  Glasses for distance vision only.  No change in eyeglass prescription.     OTC readers +1.00 as needed for small print.     Continue eyelid cleansing at night with tea tree oil foam.     Heat to the eyes daily for 10-15 minutes nightly with warm washcloth or reusable gel masks from the pharmacy or  HouseTab heat masks can be purchased at [employer].    Xiidra- (Liftegras ophthalmic solution 5 %) 1 drop both eyes 2 x day.      Artificial tears- 1 drop both eyes 2-4 x daily.      OTC Pataday to be used once or twice daily for itchy eyes.  Use as needed.     Return in 1 year for a complete eye exam and dilated fundus exam or sooner if needed.     Josiah Armstrong, OD                  Again, thank you for allowing me to participate in the care of your patient.        Sincerely,        Josiah Armstrong, OD

## 2023-12-01 NOTE — PROGRESS NOTES
[facility] Dermatology Note  Encounter Date: Dec 4, 2023  Store-and-Forward and Telephone ([phone] ). Location of teledermatologist: [facility].  Start time: 8:57. End time: 8:59.      Dermatology Problem List:  1. Rosacea, possibly granulomatous or acne rosacea clears with doxy, recurs with stopping.   - Current tx: metrocream 0.75% cream once daily,  OTC azelaic acid cream once daily, doxycycline 100 mg every 2-3 days  - hx of azelaic acid 15% gel, minocycline (took only 3-4 days due to stomach upset), soolantra (cost prohibitive), doxy 40mg (cost prohibitive), ivermectin 15 mg once monthly x3 months (no improvement), doxy once weekly (flares), permethrin cream  2. Eczema and seb derm, posterior neck  - current tx: triamcinolone 0.1% ointment BID     Social history:  with one child (11 years old), no plans for more children. Has an IUD to prevent pregnancy,  with vasectomy. Works in probation.    ____________________________________________    Assessment & Plan:     # Rosacea, consider granulomatous but is severe. He was on doxy, permethrin cream, metro cream and azelaic acid cream and doing well, long term plan was accutane  -doxycline for flares. Continue as needed  -no change to metrocream,  and azelaic acid cream. Refilled today  -accutane recommended, declined  -permethrin cream stopped by pt  Procedures Performed:    None    Follow-up: 1 year(s) virtually (telephone with photos), or earlier for new or changing lesions    Staff and Scribe:   Scribe Disclosure:   I, Cindi Jacob, am serving as a scribe to document services personally performed by Mere Liu MD based on data collection and the provider's statements to me.   Provider Disclosure:   The documentation recorded by the scribe accurately reflects the services I personally performed and the decisions made by me.    Mere Liu MD    Department of  Dermatology  [facility]: Phone: [phone], Fax:[phone]  [facility]: Phone: [phone], Fax: [phone]   ____________________________________________    CC: RECHECK    HPI:  Ms. Nichole Berkowitz is a(n) 48 year old female who presents today as a return patient for rosacea.    Last seen on 8/17/23 via virtual visit. Added doxycycline for flares. Recommended Accutane, patient declined.    Today, patient reports improvment.  She is taking the doxycycline approximately 3 times weekly. Azelaic acid and metro cream in AM. In the PM she take the Ivermectin and metro cream.     Patient is otherwise feeling well, without additional skin concerns.    Labs Reviewed:  N/A    Physical Exam:  Vitals: There were no vitals taken for this visit.  SKIN: Teledermatology photos were reviewed; image quality and interpretability: acceptable. Image date: see upload date.  - Faint Erythematous patches on cheeks  - No other lesions of concern on areas examined.     Medications:  Current Outpatient Medications   Medication    azelaic acid (FINACIA) 15 % external gel    doxycycline monohydrate (MONODOX) 100 MG capsule    ivermectin (SOOLANTRA) 1 % cream    metroNIDAZOLE (METROCREAM) 0.75 % external cream    lifitegrast (XIIDRA) 5 % opthalmic solution    lisinopril (ZESTRIL) 10 MG tablet    Multiple Vitamins-Minerals (EYE HEALTH) CAPS     No current facility-administered medications for this visit.      Past Medical/Surgical History:   Patient Active Problem List   Diagnosis    Acne rosacea    Melasma    Chronic seasonal allergic rhinitis due to pollen    Family history of hemochromatosis     Past Medical History:   Diagnosis Date    Hypertension        CC Referred Self, MD  No address on file on close of this encounter.

## 2023-12-04 ENCOUNTER — VIRTUAL VISIT (OUTPATIENT)
Dept: DERMATOLOGY | Facility: CLINIC | Age: 48
End: 2023-12-04
Payer: COMMERCIAL

## 2023-12-04 DIAGNOSIS — L71.9 ACNE ROSACEA: Primary | ICD-10-CM

## 2023-12-04 DIAGNOSIS — L71.8 GRANULOMATOUS ROSACEA: ICD-10-CM

## 2023-12-04 PROCEDURE — 99443 PR PHYSICIAN TELEPHONE EVALUATION 21-30 MIN: CPT | Mod: 95 | Performed by: DERMATOLOGY

## 2023-12-04 RX ORDER — AZELAIC ACID 0.15 G/G
GEL TOPICAL
Qty: 50 G | Refills: 8 | Status: SHIPPED | OUTPATIENT
Start: 2023-12-04

## 2023-12-04 RX ORDER — DOXYCYCLINE 100 MG/1
CAPSULE ORAL
Qty: 45 CAPSULE | Refills: 11 | Status: SHIPPED | OUTPATIENT
Start: 2023-12-04

## 2023-12-04 RX ORDER — IVERMECTIN 10 MG/G
CREAM TOPICAL
Qty: 45 G | Refills: 11 | Status: SHIPPED | OUTPATIENT
Start: 2023-12-04

## 2023-12-04 ASSESSMENT — PAIN SCALES - GENERAL: PAINLEVEL: NO PAIN (0)

## 2023-12-04 NOTE — NURSING NOTE
Teledermatology Nurse Call Patients:     Are you in the [facility] at the time of the encounter? yes    Today's visit will be billed to you and your insurance.    A teledermatology visit is not as thorough as an in-person visit and the quality of the photograph sent may not be of the same quality as that taken by the dermatology clinic.    Chief Complaint   Patient presents with    MYNOR Nielsen

## 2023-12-04 NOTE — LETTER
12/4/2023         RE: Nichole Berkowitz  [address] [place] [address]        Dear Colleague,    Thank you for referring your patient, Nichole Berkowitz, to the [facility]. Please see a copy of my visit note below.      [facility] Dermatology Note  Encounter Date: Dec 4, 2023  Store-and-Forward and Telephone ([phone] ). Location of teledermatologist: [facility].  Start time: 8:57. End time: 8:59.      Dermatology Problem List:  1. Rosacea, possibly granulomatous or acne rosacea clears with doxy, recurs with stopping.   - Current tx: metrocream 0.75% cream once daily,  OTC azelaic acid cream once daily, doxycycline 100 mg every 2-3 days  - hx of azelaic acid 15% gel, minocycline (took only 3-4 days due to stomach upset), soolantra (cost prohibitive), doxy 40mg (cost prohibitive), ivermectin 15 mg once monthly x3 months (no improvement), doxy once weekly (flares), permethrin cream  2. Eczema and seb derm, posterior neck  - current tx: triamcinolone 0.1% ointment BID     Social history:  with one child (11 years old), no plans for more children. Has an IUD to prevent pregnancy,  with vasectomy. Works in probation.    ____________________________________________    Assessment & Plan:     # Rosacea, consider granulomatous but is severe. He was on doxy, permethrin cream, metro cream and azelaic acid cream and doing well, long term plan was accutane  -doxycline for flares. Continue as needed  -no change to metrocream,  and azelaic acid cream. Refilled today  -accutane recommended, declined  -permethrin cream stopped by pt  Procedures Performed:    None    Follow-up: 1 year(s) virtually (telephone with photos), or earlier for new or changing lesions    Staff and Scribe:   Scribe Disclosure:   I, Cindi Jacob, am serving as a scribe to document services personally performed by Mere Liu MD based on data  collection and the provider's statements to me.   Provider Disclosure:   The documentation recorded by the scribe accurately reflects the services I personally performed and the decisions made by me.    Mere Liu MD    Department of Dermatology  [facility]: Phone: [phone], Fax:[phone]  [facility]: Phone: [phone], Fax: [phone]   ____________________________________________    CC: RECHECK    HPI:  Ms. Nichole Berkowitz is a(n) 48 year old female who presents today as a return patient for rosacea.    Last seen on 8/17/23 via virtual visit. Added doxycycline for flares. Recommended Accutane, patient declined.    Today, patient reports improvment.  She is taking the doxycycline approximately 3 times weekly. Azelaic acid and metro cream in AM. In the PM she take the Ivermectin and metro cream.     Patient is otherwise feeling well, without additional skin concerns.    Labs Reviewed:  N/A    Physical Exam:  Vitals: There were no vitals taken for this visit.  SKIN: Teledermatology photos were reviewed; image quality and interpretability: acceptable. Image date: see upload date.  - Faint Erythematous patches on cheeks  - No other lesions of concern on areas examined.     Medications:  Current Outpatient Medications   Medication     azelaic acid (FINACIA) 15 % external gel     doxycycline monohydrate (MONODOX) 100 MG capsule     ivermectin (SOOLANTRA) 1 % cream     metroNIDAZOLE (METROCREAM) 0.75 % external cream     lifitegrast (XIIDRA) 5 % opthalmic solution     lisinopril (ZESTRIL) 10 MG tablet     Multiple Vitamins-Minerals (EYE HEALTH) CAPS     No current facility-administered medications for this visit.      Past Medical/Surgical History:   Patient Active Problem List   Diagnosis     Acne rosacea     Melasma     Chronic seasonal allergic rhinitis due to pollen      Family history of hemochromatosis     Past Medical History:   Diagnosis Date     Hypertension        CC Referred Self, MD  No address on file on close of this encounter.      Again, thank you for allowing me to participate in the care of your patient.        Sincerely,        Mere Liu MD

## 2023-12-04 NOTE — PATIENT INSTRUCTIONS
[facility] Dermatology Visit    Thank you for allowing us to participate in your care. Your findings, instructions and follow-up plan are as follows:          When should I call my doctor?  If you are worsening or not improving, please, contact us or seek urgent care as noted below.     Who should I call with questions (adults)?  [facility] (adult and pediatric): [phone]  [facility] (adult): [phone]  For urgent needs outside of business hours call the [facility] at [phone] and ask for the dermatology resident on call  If this is a medical emergency and you are unable to reach an ER, Call 911    Who should I call with questions (pediatric)?  [facility]- Pediatric Dermatology  Dr. Carmela Mcconnell, Dr. Maliha Sotelo, Dr. Kate Dwyer, Brandy Lawrence, PA  Dr. Flavia Guzman, Dr. Caitlin Lechuga & Dr. Reginaldo Hardin  Non Urgent  Nurse Triage Line; [phone]- Dolly and Erlinda RN Care Coordinators   [place] (/Complex ) [phone]    If you need a prescription refill, please contact your pharmacy. Refills are approved or denied by our physicians during normal business hours, Monday through Fridays  Per office policy, refills will not be granted if you have not been seen within the past year (or sooner depending on your child's condition).    Scheduling Information:  Pediatric Appointment Scheduling and Call Center [phone]  Radiology Scheduling- [phone]  Sedation Unit Scheduling- [phone]  [facility] Scheduling- General [phone]; Pediatric Dermatology [phone]  Main  Services: [phone]  Lithuanian: [phone]  Syrian: [phone]  Hmong/Arabic/Indonesian: [phone]  Preadmission Nursing Department Fax Number: [phone] (fax all pre-operative paperwork to this number)    For urgent matters arising during evenings, weekends, or  holidays that cannot wait for normal business hours please call [phone] and ask for the dermatology resident on call to be paged.

## 2024-07-07 ENCOUNTER — HEALTH MAINTENANCE LETTER (OUTPATIENT)
Age: 49
End: 2024-07-07

## 2024-12-04 NOTE — PROGRESS NOTES
[facility] Dermatology Note  Encounter Date: Dec 5, 2024  Store-and-Forward and Telephone ([phone]). Location of teledermatologist: [facility].  Start time: 4:15. End time: 4:24pm.    Dermatology Problem List:  1. Rosacea, possibly granulomatous or acne rosacea clears with doxy, recurs with stopping.   - Current tx: metrocream 0.75% cream once daily,  OTC azelaic acid cream once daily, doxycycline 100 mg every 2-3 days  - hx of azelaic acid 15% gel, minocycline (took only 3-4 days due to stomach upset), soolantra (cost prohibitive), doxy 40mg (cost prohibitive), ivermectin 15 mg once monthly x3 months (no improvement), doxy once weekly (flares), permethrin cream  2. Eczema and seb derm, posterior neck  - current tx: triamcinolone 0.1% ointment BID     Social history:  with one child (11 years old), no plans for more children. Has an IUD to prevent pregnancy,  with vasectomy. Works in probation.    ____________________________________________    Assessment & Plan:     # Rosacea, consider granulomatous but is severe. He was on doxy, permethrin cream, metro cream and azelaic acid cream and doing well, long term plan was accutane  -Continue doxycyline PRN for flares.I did give her a refills for flares only   -metrocream, permethrin cream, azelaiac and soolantra    Procedures Performed:    None    Follow-up: 1 year, earlier as needed  Staff and Scribe:   Scribe Disclosure:   I, Cindi Jacob, am serving as a scribe to document services personally performed by Mere Liu MD based on data collection and the provider's statements to me.     Provider Disclosure:   The documentation recorded by the scribe accurately reflects the services I personally performed and the decisions made by me.    Mere Liu MD    Department of Dermatology  [facility]: Phone: [phone],  Fax:[phone]  [facility]: Phone: [phone], Fax: [phone]     ____________________________________________    CC: Rosacea (Follow up for Rosacea. Topical has been working well, but pt has not been taking oral medication because topicals have been working!)    HPI:  Ms. Nichole Berkowitz is a(n) 49 year old female who presents today as a return patient for derm problem    Today, patient reports she is doing well and wants refills. Has not needed doxy    Patient is otherwise feeling well, without additional skin concerns.    Labs Reviewed:  N/A    Physical Exam:  Vitals: There were no vitals taken for this visit.  SKIN: Teledermatology photos were reviewed; image quality and interpretability: acceptable. Image date: 4:24pm.  - telangiectasias  -reviewed with patient that pigmented lesions are not assessable via photography and would need in person visit    - No other lesions of concern on areas examined.     Medications:  Current Outpatient Medications   Medication Sig Dispense Refill    azelaic acid (FINACIA) 15 % external gel Apply a thin layer to the face once daily. 50 g 8    ivermectin (SOOLANTRA) 1 % cream Apply to the affected areas of the face once daily. Use a pea-size amount for each area of the face (forehead, chin, nose, each cheek) that is affected. Spread as a thin layer, avoiding the eyes and lips. 45 g 11    lisinopril (ZESTRIL) 10 MG tablet Take 10 mg by mouth daily      metroNIDAZOLE (METROCREAM) 0.75 % external cream Apply thin layer to the face once daily. 45 g 11    doxycycline monohydrate (MONODOX) 100 MG capsule Take 100mg every 2-3 days as needed for rosacea. (Patient not taking: Reported on 12/5/2024) 45 capsule 11    Multiple Vitamins-Minerals (EYE HEALTH) CAPS  (Patient not taking: Reported on 12/5/2024)      propranolol (INDERAL) 20 MG tablet Take 20 mg by mouth as needed.       No current facility-administered medications for  this visit.      Past Medical/Surgical History:   Patient Active Problem List   Diagnosis    Acne rosacea    Melasma    Chronic seasonal allergic rhinitis due to pollen    Family history of hemochromatosis     Past Medical History:   Diagnosis Date    Hypertension        CC Mere Liu MD  [address] [facility] [address]  [place] [address] on close of this encounter.

## 2024-12-05 ENCOUNTER — VIRTUAL VISIT (OUTPATIENT)
Dept: DERMATOLOGY | Facility: CLINIC | Age: 49
End: 2024-12-05
Attending: DERMATOLOGY
Payer: COMMERCIAL

## 2024-12-05 DIAGNOSIS — L71.8 GRANULOMATOUS ROSACEA: ICD-10-CM

## 2024-12-05 DIAGNOSIS — L71.9 ACNE ROSACEA: Primary | ICD-10-CM

## 2024-12-05 RX ORDER — AZELAIC ACID 0.15 G/G
GEL TOPICAL
Qty: 50 G | Refills: 8 | Status: SHIPPED | OUTPATIENT
Start: 2024-12-05

## 2024-12-05 RX ORDER — DOXYCYCLINE 100 MG/1
CAPSULE ORAL
Qty: 45 CAPSULE | Refills: 1 | Status: SHIPPED | OUTPATIENT
Start: 2024-12-05

## 2024-12-05 RX ORDER — IVERMECTIN 10 MG/G
CREAM TOPICAL
Qty: 45 G | Refills: 11 | Status: SHIPPED | OUTPATIENT
Start: 2024-12-05

## 2024-12-05 RX ORDER — PROPRANOLOL HCL 20 MG
20 TABLET ORAL PRN
COMMUNITY

## 2024-12-05 NOTE — NURSING NOTE
Teledermatology Nurse Call Patients:     Are you in the [facility] at the time of the encounter? yes    Today's visit will be billed to you and your insurance.    A teledermatology visit is not as thorough as an in-person visit and the quality of the photograph sent may not be of the same quality as that taken by the dermatology clinic.     Nichole Berkowitz's goals for this visit include:   Chief Complaint   Patient presents with    Rosacea     Follow up for Rosacea. Topical has been working well, but pt has not been taking oral medication because topicals have been working!       She requests these members of her care team be copied on today's visit information:     PCP: Sea Hernandez    Referring Provider:  Mere Liu MD  [address] [place] [id number]    There were no vitals taken for this visit.    Do you need any medication refills at today's visit?     Olga Lidia Morfin on 12/5/2024 at 3:48 PM

## 2024-12-05 NOTE — LETTER
12/5/2024      Nichole Berkowitz  [address] [facility] [address]      Dear Colleague,    Thank you for referring your patient, Nichole Berkowitz, to the [facility]. Please see a copy of my visit note below.      Formerly [facility] Dermatology Note  Encounter Date: Dec 5, 2024  Store-and-Forward and Telephone ([phone]). Location of teledermatologist: [facility].  Start time: 4:15. End time: 4:24pm.    Dermatology Problem List:  1. Rosacea, possibly granulomatous or acne rosacea clears with doxy, recurs with stopping.   - Current tx: metrocream 0.75% cream once daily,  OTC azelaic acid cream once daily, doxycycline 100 mg every 2-3 days  - hx of azelaic acid 15% gel, minocycline (took only 3-4 days due to stomach upset), soolantra (cost prohibitive), doxy 40mg (cost prohibitive), ivermectin 15 mg once monthly x3 months (no improvement), doxy once weekly (flares), permethrin cream  2. Eczema and seb derm, posterior neck  - current tx: triamcinolone 0.1% ointment BID     Social history:  with one child (11 years old), no plans for more children. Has an IUD to prevent pregnancy,  with vasectomy. Works in probation.    ____________________________________________    Assessment & Plan:     # Rosacea, consider granulomatous but is severe. He was on doxy, permethrin cream, metro cream and azelaic acid cream and doing well, long term plan was accutane  -Continue doxycyline PRN for flares.I did give her a refills for flares only   -metrocream, permethrin cream, azelaiac and soolantra    Procedures Performed:    None    Follow-up: 1 year, earlier as needed  Staff and Scribe:   Scribe Disclosure:   I, Cindi Jacob, am serving as a scribe to document services personally performed by Mere Liu MD based on data collection and the provider's statements to me.     Provider Disclosure:   The documentation recorded by the scribe  accurately reflects the services I personally performed and the decisions made by me.    Mere Liu MD    Department of Dermatology  [facility]: Phone: [phone], Fax:[phone]  [facility]: Phone: [phone], Fax: [phone]     ____________________________________________    CC: Rosacea (Follow up for Rosacea. Topical has been working well, but pt has not been taking oral medication because topicals have been working!)    HPI:  Ms. Nichole Berkowitz is a(n) 49 year old female who presents today as a return patient for derm problem    Today, patient reports she is doing well and wants refills. Has not needed doxy    Patient is otherwise feeling well, without additional skin concerns.    Labs Reviewed:  N/A    Physical Exam:  Vitals: There were no vitals taken for this visit.  SKIN: Teledermatology photos were reviewed; image quality and interpretability: acceptable. Image date: 4:24pm.  - telangiectasias  -reviewed with patient that pigmented lesions are not assessable via photography and would need in person visit    - No other lesions of concern on areas examined.     Medications:  Current Outpatient Medications   Medication Sig Dispense Refill     azelaic acid (FINACIA) 15 % external gel Apply a thin layer to the face once daily. 50 g 8     ivermectin (SOOLANTRA) 1 % cream Apply to the affected areas of the face once daily. Use a pea-size amount for each area of the face (forehead, chin, nose, each cheek) that is affected. Spread as a thin layer, avoiding the eyes and lips. 45 g 11     lisinopril (ZESTRIL) 10 MG tablet Take 10 mg by mouth daily       metroNIDAZOLE (METROCREAM) 0.75 % external cream Apply thin layer to the face once daily. 45 g 11     doxycycline monohydrate (MONODOX) 100 MG capsule Take 100mg every 2-3 days as needed for rosacea. (Patient not taking:  Reported on 12/5/2024) 45 capsule 11     Multiple Vitamins-Minerals (EYE HEALTH) CAPS  (Patient not taking: Reported on 12/5/2024)       propranolol (INDERAL) 20 MG tablet Take 20 mg by mouth as needed.       No current facility-administered medications for this visit.      Past Medical/Surgical History:   Patient Active Problem List   Diagnosis     Acne rosacea     Melasma     Chronic seasonal allergic rhinitis due to pollen     Family history of hemochromatosis     Past Medical History:   Diagnosis Date     Hypertension        CC Mere Liu MD  [address]  [place] [address] on close of this encounter.       Again, thank you for allowing me to participate in the care of your patient.        Sincerely,        Mere Liu MD

## 2025-01-23 ENCOUNTER — OFFICE VISIT (OUTPATIENT)
Dept: OPTOMETRY | Facility: CLINIC | Age: 50
End: 2025-01-23
Payer: COMMERCIAL

## 2025-01-23 DIAGNOSIS — Z98.890 HX OF LASIK: ICD-10-CM

## 2025-01-23 DIAGNOSIS — H04.123 DRY EYE SYNDROME OF BOTH EYES: ICD-10-CM

## 2025-01-23 DIAGNOSIS — H52.4 PRESBYOPIA: ICD-10-CM

## 2025-01-23 DIAGNOSIS — H52.13 MYOPIA OF BOTH EYES: ICD-10-CM

## 2025-01-23 DIAGNOSIS — H52.223 REGULAR ASTIGMATISM OF BOTH EYES: ICD-10-CM

## 2025-01-23 DIAGNOSIS — Z01.00 EXAMINATION OF EYES AND VISION: Primary | ICD-10-CM

## 2025-01-23 DIAGNOSIS — H02.889 MEIBOMIAN GLAND DYSFUNCTION: ICD-10-CM

## 2025-01-23 ASSESSMENT — KERATOMETRY
OS_AXISANGLE2_DEGREES: 019
OD_K2POWER_DIOPTERS: 39.75
OD_AXISANGLE2_DEGREES: 141
OD_AXISANGLE_DEGREES: 051
OS_K2POWER_DIOPTERS: 40.00
OS_AXISANGLE_DEGREES: 109
OS_K1POWER_DIOPTERS: 39.25
OD_K1POWER_DIOPTERS: 39.25

## 2025-01-23 ASSESSMENT — REFRACTION_WEARINGRX
OD_CYLINDER: SPHERE
OD_SPHERE: -1.25
OS_CYLINDER: +0.50
OS_SPHERE: -1.25
SPECS_TYPE: SVL
OS_AXIS: 035

## 2025-01-23 ASSESSMENT — VISUAL ACUITY
OD_CC+: -1
OD_SC: 20/25
OD_SC: 20/25
OD_SC+: -2
OS_SC: 20/25
OS_CC: 20/20
OD_CC: 20/20
OS_SC: 20/30
CORRECTION_TYPE: GLASSES
METHOD: SNELLEN - LINEAR

## 2025-01-23 ASSESSMENT — REFRACTION_MANIFEST
OS_CYLINDER: +0.50
OS_AXIS: 035
OD_SPHERE: -1.50
OD_CYLINDER: SPHERE
OS_SPHERE: -1.25

## 2025-01-23 ASSESSMENT — SLIT LAMP EXAM - LIDS
COMMENTS: MEIBOMIAN GLAND DYSFUNCTION
COMMENTS: MEIBOMIAN GLAND DYSFUNCTION

## 2025-01-23 ASSESSMENT — EXTERNAL EXAM - LEFT EYE: OS_EXAM: ROSACEA

## 2025-01-23 ASSESSMENT — CONF VISUAL FIELD
OS_SUPERIOR_NASAL_RESTRICTION: 0
OD_INFERIOR_TEMPORAL_RESTRICTION: 0
OD_SUPERIOR_TEMPORAL_RESTRICTION: 0
OD_NORMAL: 1
OD_SUPERIOR_NASAL_RESTRICTION: 0
OS_SUPERIOR_TEMPORAL_RESTRICTION: 0
OS_INFERIOR_TEMPORAL_RESTRICTION: 0
OS_INFERIOR_NASAL_RESTRICTION: 0
OD_INFERIOR_NASAL_RESTRICTION: 0
OS_NORMAL: 1

## 2025-01-23 ASSESSMENT — EXTERNAL EXAM - RIGHT EYE: OD_EXAM: ROSACEA

## 2025-01-23 ASSESSMENT — TONOMETRY
OD_IOP_MMHG: 19
IOP_METHOD: TONOPEN
OS_IOP_MMHG: 17

## 2025-01-23 ASSESSMENT — CUP TO DISC RATIO
OD_RATIO: 0.1
OS_RATIO: 0.1

## 2025-01-23 NOTE — LETTER
"1/23/2025      Nichole Berkowitz  [address]  [facility] [address]      Dear Colleague,    Thank you for referring your patient, Nichole Berkowitz, to the [facility] [facility]. Please see a copy of my visit note below.    Chief Complaint   Patient presents with     Annual Eye Exam         Last Eye Exam: 11-  Dilated Previously: Yes    What are you currently using to see?  glasses       Distance Vision Acuity: Noticed gradual change in both eyes    Near Vision Acuity: Not satisfied unaided does not wear glasses for reading     Eye Comfort: dry  Do you use eye drops? : Yes: [employer]/refresh- every night-   Occupation or Hobbies:      Plan: from visit 11/30/2023-    OTC readers +1.00 as needed for small print.  Continue eyelid cleansing at night with tea tree oil foam.(Needs to get new)  Heat to the eyes daily for 10-15 minutes nightly with warm washcloth or reusable gel masks from the pharmacy or  LogicMonitor heat masks can be purchased at [employer]. (Not faithful)  Xiidra- (Liftegras ophthalmic solution 5 %) 1 drop both eyes 2 x day. (Tried and didn't like - made it worse)   Artificial tears- 1 drop both eyes 2-4 x daily.  OTC Pataday to be used once or twice daily for itchy eyes.  Use as needed.\" (Not needed)       Isa Xavier Optometric Assistant, A.B.O.C.        Medical, surgical and family histories reviewed and updated 1/23/2025.       OBJECTIVE: See Ophthalmology exam    ASSESSMENT:    ICD-10-CM    1. Examination of eyes and vision  Z01.00       2. Myopia of both eyes  H52.13       3. Regular astigmatism of both eyes  H52.223       4. Presbyopia  H52.4       5. Hx of LASIK  Z98.890       6. Meibomian gland dysfunction  H02.889       7. Dry eye syndrome of both eyes  H04.123           PLAN:     Patient Instructions   Eyeglass prescription given.  Glasses for distance vision only.    OTC readers +1.00 as needed for small print.     Continue eyelid cleansing at " night with tea tree oil foam.     Heat to the eyes daily for 10-15 minutes nightly with warm washcloth or reusable gel masks from the pharmacy or  [employer] heat masks can be purchased at Amazon.     Artificial tears- 1 drop both eyes once before bedtime and once in the morning then 2 x day as needed.      Return in 1 year for a complete eye exam or sooner if needed.    Josiah Armstrong, OD                  Again, thank you for allowing me to participate in the care of your patient.        Sincerely,        Josiah Armstrong, OD    Electronically signed

## 2025-01-23 NOTE — PROGRESS NOTES
"Chief Complaint   Patient presents with    Annual Eye Exam         Last Eye Exam: 11-  Dilated Previously: Yes    What are you currently using to see?  glasses       Distance Vision Acuity: Noticed gradual change in both eyes    Near Vision Acuity: Not satisfied unaided does not wear glasses for reading     Eye Comfort: dry  Do you use eye drops? : Yes: [employer]/refresh- every night-   Occupation or Hobbies:      Plan: from visit 11/30/2023-    OTC readers +1.00 as needed for small print.  Continue eyelid cleansing at night with tea tree oil foam.(Needs to get new)  Heat to the eyes daily for 10-15 minutes nightly with warm washcloth or reusable gel masks from the pharmacy or  NoRedInk heat masks can be purchased at [employer]. (Not faithful)  Xiidra- (Liftegras ophthalmic solution 5 %) 1 drop both eyes 2 x day. (Tried and didn't like - made it worse)   Artificial tears- 1 drop both eyes 2-4 x daily.  OTC Pataday to be used once or twice daily for itchy eyes.  Use as needed.\" (Not needed)       Isa Xavier Optometric Assistant, A.B.O.C.        Medical, surgical and family histories reviewed and updated 1/23/2025.       OBJECTIVE: See Ophthalmology exam    ASSESSMENT:    ICD-10-CM    1. Examination of eyes and vision  Z01.00       2. Myopia of both eyes  H52.13       3. Regular astigmatism of both eyes  H52.223       4. Presbyopia  H52.4       5. Hx of LASIK  Z98.890       6. Meibomian gland dysfunction  H02.889       7. Dry eye syndrome of both eyes  H04.123           PLAN:     Patient Instructions   Eyeglass prescription given.  Glasses for distance vision only.    OTC readers +1.00 as needed for small print.     Continue eyelid cleansing at night with tea tree oil foam.     Heat to the eyes daily for 10-15 minutes nightly with warm washcloth or reusable gel masks from the pharmacy or  NoRedInk heat masks can be purchased at Amazon.     Artificial tears- 1 drop both eyes once before bedtime and once " in the morning then 2 x day as needed.      Return in 1 year for a complete eye exam or sooner if needed.    Josiah Armstrong, OD

## 2025-01-23 NOTE — PATIENT INSTRUCTIONS
Eyeglass prescription given.  Glasses for distance vision only.    OTC readers +1.00 as needed for small print.     Continue eyelid cleansing at night with tea tree oil foam.     Heat to the eyes daily for 10-15 minutes nightly with warm washcloth or reusable gel masks from the pharmacy or  MaidSafe heat masks can be purchased at Amazon.     Artificial tears- 1 drop both eyes once before bedtime and once in the morning then 2 x day as needed.      Return in 1 year for a complete eye exam or sooner if needed.    Josiah Armstrong, OD    The affects of the dilating drops last for 4- 6 hours.  You will be more sensitive to light and vision will be blurry up close.  Do not drive if you do not feel comfortable.  Mydriatic sunglasses were given if needed.      Optometry Providers       Clinic Locations                                 Telephone Number   Dr. Cora Pham [place]    [facility] [phone]     [place] [facility] Hours:                [place] [facility] Hours:       [place] [facility] Hours:   [address]   [address]      [address] [place] [address]   [place] [address]    [place] [address]  Phone: [phone]                    Phone: [phone]     Phone: [phone]                      Monday 8:00-6:00                          Monday 8:00-6:00                          Monday 8:00-6:00              Tuesday 8:00-6:00                          Tuesday 8:00-6:00                          Tuesday 8:00-6:00              Wednesday 8:00-6:00                  Wednesday 8:00-6:00                   Wednesday 8:00-6:00      Thursday 8:00-6:00                        Thursday 8:00-6:00                         Thursday 8:00-6:00            Friday 8:00-5:00                              Friday 8:00-5:00                               Friday 8:00-5:00    [facility] Hours:   [address] [place] [address]  [phone]    Monday 9:00-6:00  Tuesday 9:00-6:00  Wednesday 9:00-6:00  Thursday 9:00-6:00  Friday 9:00-5:00  As always, Thank you for trusting us with your health care needs!    There is a combination of three treatments which can greatly improve symptoms of dry eyes.     Artificial tears  Heat (eyes closed)  Eyelid and eyelash cleansing (eyes closed)     Use one drop of artificial tears both eyes 4 x daily.  Once in the morning, lunch, dinner and bedtime. Continue to use the drops regardless if your eyes are comfortable or not.  Artificial tears work best as a preventative and not as well after your eyes are starting to bother you.  It may take 4- 6 weeks of using the drops before you notice improvement.  If after that time you are still having problems schedule an appointment for an evaluation and discussion of different treatments such as Restasis or Xiidra.  Dry eyes are a chronic condition and you may have more symptoms at certain times of the year.    Excess tearing can be due to the right tears not working properly or a blockage in the tear drainage system.  You can try using artificial tears 1 drop both eyes 4 x day.  If the excess tearing is bothersome after 4-6 weeks of treatment then we can send you for further testing.  This would entail a referral to our oculoplastic specialist Dr. Janet Bush at the [facility]-[phone].    Recommended brands are:    Systane Complete  Systane Ultra  Systane Balance  Refresh Advanced Optive  Refresh Relieva  Blink    Recommended brands for contact lens wearers are:    Systane contacts  Refresh contacts  Blink contacts    If you are using drops more than 4 x day or have sensitivities to preservatives I recommend non preserved artificial tears.  These come in 1 use vials.  They can be used every 1-2 hours.  Do not reuse the vials.    Recommended  brands are:    Refresh Optive Franki-3  Systane- preservative free  Refresh-  preservative free  Blink- preservative free    Gels or ointment can be used at night.    Recommended brands are:    Systane Gel  Refresh Gel  Blink Gel  Genteal Gel    Systane night time (ointment)  Refresh Celluvisc  Refresh PM (ointment)    Optase dry eye spray.  Spray to eyelids 3-4 x daily.  This can be purchased on Amazon.      Visine, Clear Eyes or Murine (drops that get the red out) can irritate the eyes and cause a rebound effect where the eyes become more red and you end up using more drops.  Avoid drops containing tetrahydrozoline, naphazoline, phenylephrine, oxymetazoline.      OTC Lumify is a newer product that gives immediate redness relief without the rebound effect.  Use as needed to take the redness out.    Artificial tears may be used with other drops (such as allergy, glaucoma, antibiotics) around the same time.  Be sure to wait 5 minutes in between drops.    Heat to the eyelids can also improve your symptoms of dry eyes.  Isidoro heat masks can be purchased at Amazon to be used nightly for 10-15 minutes.  Other options are gel masks that can be put in the microwave and purchased at most pharmacies.      Tea Tree Oil eyelid cleansers recommended are Ocusoft Oust foam cleanser to cleanse eyelids/lashes at night and in the am. Other options are Blephadex or Cliradex eyelid wipes.  KEEP EYES CLOSED when using these products.  These can be purchased on amazon.com   A good product for make up remover with tea tree oil is WeLoveEyes.  This can be found at www.[facility] [employer] or "[employer].    Other good eyelid cleansers have hypochlorous which removes excess bacteria and is safe around the eyes. Products are Avenova, Ocusoft Hypochlor or Heyedrate. Spray solution onto cotton pad, close eyes and gently apply to eyelids and eyelashes using side to side motion.  You can also KEEP EYES CLOSED spray and rub into eyelashes.  You do not  need to rinse it off. Use morning and evening. These products can be found on Amazon.  You can check with your local pharmacy and see if they can order if for you if they don't have it.    Other brands of eyelid cleansing wipes are:    Ocusoft wipes  Systane wipes    A great eye make up line is [email or web address]

## 2025-07-13 ENCOUNTER — HEALTH MAINTENANCE LETTER (OUTPATIENT)
Age: 50
End: 2025-07-13